# Patient Record
Sex: MALE | Race: WHITE | NOT HISPANIC OR LATINO
[De-identification: names, ages, dates, MRNs, and addresses within clinical notes are randomized per-mention and may not be internally consistent; named-entity substitution may affect disease eponyms.]

---

## 2020-09-22 ENCOUNTER — TRANSCRIPTION ENCOUNTER (OUTPATIENT)
Age: 70
End: 2020-09-22

## 2020-09-29 ENCOUNTER — TRANSCRIPTION ENCOUNTER (OUTPATIENT)
Age: 70
End: 2020-09-29

## 2020-11-21 ENCOUNTER — TRANSCRIPTION ENCOUNTER (OUTPATIENT)
Age: 70
End: 2020-11-21

## 2020-11-22 ENCOUNTER — TRANSCRIPTION ENCOUNTER (OUTPATIENT)
Age: 70
End: 2020-11-22

## 2021-02-08 ENCOUNTER — EMERGENCY (EMERGENCY)
Facility: HOSPITAL | Age: 71
LOS: 1 days | Discharge: HOME | End: 2021-02-08
Attending: EMERGENCY MEDICINE | Admitting: EMERGENCY MEDICINE
Payer: COMMERCIAL

## 2021-02-08 VITALS
RESPIRATION RATE: 16 BRPM | DIASTOLIC BLOOD PRESSURE: 90 MMHG | HEART RATE: 88 BPM | OXYGEN SATURATION: 99 % | SYSTOLIC BLOOD PRESSURE: 148 MMHG | TEMPERATURE: 99 F

## 2021-02-08 PROCEDURE — 99284 EMERGENCY DEPT VISIT MOD MDM: CPT

## 2021-02-08 NOTE — ED ADULT TRIAGE NOTE - CHIEF COMPLAINT QUOTE
Pt c/o abdominal pain x 1 day. Pain is primarily LLQ. States he has been constipated for a couple of days, had BM today, but pain still present. Denies N/V/D, fevers. PMHx GERD, Pituitary adenoma

## 2021-02-09 LAB
ALBUMIN SERPL ELPH-MCNC: 3.9 G/DL — SIGNIFICANT CHANGE UP (ref 3.3–5)
ALP SERPL-CCNC: 45 U/L — SIGNIFICANT CHANGE UP (ref 40–120)
ALT FLD-CCNC: 15 U/L — SIGNIFICANT CHANGE UP (ref 4–41)
ANION GAP SERPL CALC-SCNC: 7 MMOL/L — SIGNIFICANT CHANGE UP (ref 7–14)
APPEARANCE UR: CLEAR — SIGNIFICANT CHANGE UP
AST SERPL-CCNC: 11 U/L — SIGNIFICANT CHANGE UP (ref 4–40)
BASOPHILS # BLD AUTO: 0.05 K/UL — SIGNIFICANT CHANGE UP (ref 0–0.2)
BASOPHILS NFR BLD AUTO: 0.4 % — SIGNIFICANT CHANGE UP (ref 0–2)
BILIRUB SERPL-MCNC: 0.3 MG/DL — SIGNIFICANT CHANGE UP (ref 0.2–1.2)
BILIRUB UR-MCNC: NEGATIVE — SIGNIFICANT CHANGE UP
BUN SERPL-MCNC: 21 MG/DL — SIGNIFICANT CHANGE UP (ref 7–23)
CALCIUM SERPL-MCNC: 9.4 MG/DL — SIGNIFICANT CHANGE UP (ref 8.4–10.5)
CHLORIDE SERPL-SCNC: 101 MMOL/L — SIGNIFICANT CHANGE UP (ref 98–107)
CO2 SERPL-SCNC: 28 MMOL/L — SIGNIFICANT CHANGE UP (ref 22–31)
COLOR SPEC: YELLOW — SIGNIFICANT CHANGE UP
CREAT SERPL-MCNC: 0.95 MG/DL — SIGNIFICANT CHANGE UP (ref 0.5–1.3)
DIFF PNL FLD: NEGATIVE — SIGNIFICANT CHANGE UP
EOSINOPHIL # BLD AUTO: 0.27 K/UL — SIGNIFICANT CHANGE UP (ref 0–0.5)
EOSINOPHIL NFR BLD AUTO: 2.2 % — SIGNIFICANT CHANGE UP (ref 0–6)
GLUCOSE SERPL-MCNC: 80 MG/DL — SIGNIFICANT CHANGE UP (ref 70–99)
GLUCOSE UR QL: NEGATIVE — SIGNIFICANT CHANGE UP
HCT VFR BLD CALC: 40.1 % — SIGNIFICANT CHANGE UP (ref 39–50)
HGB BLD-MCNC: 13.1 G/DL — SIGNIFICANT CHANGE UP (ref 13–17)
IANC: 8.98 K/UL — HIGH (ref 1.5–8.5)
IMM GRANULOCYTES NFR BLD AUTO: 0.5 % — SIGNIFICANT CHANGE UP (ref 0–1.5)
KETONES UR-MCNC: NEGATIVE — SIGNIFICANT CHANGE UP
LEUKOCYTE ESTERASE UR-ACNC: NEGATIVE — SIGNIFICANT CHANGE UP
LIDOCAIN IGE QN: 33 U/L — SIGNIFICANT CHANGE UP (ref 7–60)
LYMPHOCYTES # BLD AUTO: 1.63 K/UL — SIGNIFICANT CHANGE UP (ref 1–3.3)
LYMPHOCYTES # BLD AUTO: 13.1 % — SIGNIFICANT CHANGE UP (ref 13–44)
MCHC RBC-ENTMCNC: 30.7 PG — SIGNIFICANT CHANGE UP (ref 27–34)
MCHC RBC-ENTMCNC: 32.7 GM/DL — SIGNIFICANT CHANGE UP (ref 32–36)
MCV RBC AUTO: 93.9 FL — SIGNIFICANT CHANGE UP (ref 80–100)
MONOCYTES # BLD AUTO: 1.41 K/UL — HIGH (ref 0–0.9)
MONOCYTES NFR BLD AUTO: 11.4 % — SIGNIFICANT CHANGE UP (ref 2–14)
NEUTROPHILS # BLD AUTO: 8.98 K/UL — HIGH (ref 1.8–7.4)
NEUTROPHILS NFR BLD AUTO: 72.4 % — SIGNIFICANT CHANGE UP (ref 43–77)
NITRITE UR-MCNC: NEGATIVE — SIGNIFICANT CHANGE UP
NRBC # BLD: 0 /100 WBCS — SIGNIFICANT CHANGE UP
NRBC # FLD: 0 K/UL — SIGNIFICANT CHANGE UP
PH UR: 6 — SIGNIFICANT CHANGE UP (ref 5–8)
PLATELET # BLD AUTO: 277 K/UL — SIGNIFICANT CHANGE UP (ref 150–400)
POTASSIUM SERPL-MCNC: 4.1 MMOL/L — SIGNIFICANT CHANGE UP (ref 3.5–5.3)
POTASSIUM SERPL-SCNC: 4.1 MMOL/L — SIGNIFICANT CHANGE UP (ref 3.5–5.3)
PROT SERPL-MCNC: 7 G/DL — SIGNIFICANT CHANGE UP (ref 6–8.3)
PROT UR-MCNC: NEGATIVE — SIGNIFICANT CHANGE UP
RBC # BLD: 4.27 M/UL — SIGNIFICANT CHANGE UP (ref 4.2–5.8)
RBC # FLD: 13.2 % — SIGNIFICANT CHANGE UP (ref 10.3–14.5)
SODIUM SERPL-SCNC: 136 MMOL/L — SIGNIFICANT CHANGE UP (ref 135–145)
SP GR SPEC: 1.03 — HIGH (ref 1.01–1.02)
UROBILINOGEN FLD QL: SIGNIFICANT CHANGE UP
WBC # BLD: 12.4 K/UL — HIGH (ref 3.8–10.5)
WBC # FLD AUTO: 12.4 K/UL — HIGH (ref 3.8–10.5)

## 2021-02-09 PROCEDURE — 74177 CT ABD & PELVIS W/CONTRAST: CPT | Mod: 26

## 2021-02-09 RX ADMIN — Medication 1 TABLET(S): at 04:58

## 2021-02-09 NOTE — ED PROVIDER NOTE - PROGRESS NOTE DETAILS
ED Attending: Jesus  Pt signed out to me from Dr. Salcido to f/u and reassess. States pain is improving.  Divertic reviewed, rx augmentin sent.  Pt states he has a GI and is due for a colonoscopy.  Will f/u.

## 2021-02-09 NOTE — ED PROVIDER NOTE - NSFOLLOWUPINSTRUCTIONS_ED_ALL_ED_FT
Your pain is likely caused by diverticulitis, an inflammatory condition of the colon. Please take Augmentin as prescribed for 10 days. NSAID medications such as Ibuprofen 400 mg every 6 hours are effective for this kind of pain.  If you cannot take NSAIDs you may use Tylenol 650 mg every 6 hours as needed.     Return to the ER for fevers, worsening pain, bleeding, vomiting, or other concerning signs.     Please make an appointment to follow up with your GI doctor since you should have a colonoscopy once you're feeling better. Also, please follow up with your primary care doctor.

## 2021-02-09 NOTE — ED PROVIDER NOTE - PHYSICAL EXAMINATION
gen: well appearing  Mentation: AAO x 3  psych: mood appropriate  ENT: airway patent  Eyes: conjunctivae clear bilaterally  Cardio: RRR, no m/r/g  Resp: normal BS b/l  GI: s/llq tenderness/nd, no rigidity, no guarding  Neuro: AAO x 3, sensation and motor function intact,  Skin: No evidence of rash  MSK: normal movement of all extremities

## 2021-02-09 NOTE — ED ADULT NURSE NOTE - OBJECTIVE STATEMENT
Patient is a 70y male, A&Ox4, Ambulatory, complaining of left lower abdominal pain x 1 day, worsening throughout the day. Pain was severe at rest but now is tolerable while resting, worse with palpation. Patient is passing flatus. Last bowel movement today. IV initiated 20 gauge right antecubital, labs drawn and sent. Stretcher in lowest position, wheels locked , side rails up. Awaiting laboratory results. Awaiting CT.

## 2021-02-09 NOTE — ED PROVIDER NOTE - PATIENT PORTAL LINK FT
You can access the FollowMyHealth Patient Portal offered by [facility] by registering at the following website: [email or web address]. By joining [employer]’s FollowMyHealth portal, you will also be able to view your health information using other applications (apps) compatible with our system.

## 2021-02-09 NOTE — ED PROVIDER NOTE - CLINICAL SUMMARY MEDICAL DECISION MAKING FREE TEXT BOX
71 yo male presenting with 2 day hx of llq pain; rule out intra abdominal pathology with ctap; labs urine re eval; patient offered pain meds but does not want at this time.

## 2021-02-09 NOTE — ED PROVIDER NOTE - OBJECTIVE STATEMENT
69 yo male presenting with 2 day hx of llq pain associated with nausea.  Pain described as sharp with no radiation, currently 2/10 in severity.  worse with walking.  did not take anything for his symptoms.  denies testicular pain or burning with urination.

## 2021-02-09 NOTE — ED ADULT NURSE NOTE - NSIMPLEMENTINTERV_GEN_ALL_ED
Implemented All Universal Safety Interventions:  [place] to call system. Call bell, personal items and telephone within reach. Instruct patient to call for assistance. Room bathroom lighting operational. Non-slip footwear when patient is off stretcher. Physically safe environment: no spills, clutter or unnecessary equipment. Stretcher in lowest position, wheels locked, appropriate side rails in place.

## 2021-02-10 LAB
CULTURE RESULTS: SIGNIFICANT CHANGE UP
SPECIMEN SOURCE: SIGNIFICANT CHANGE UP

## 2022-05-07 ENCOUNTER — NON-APPOINTMENT (OUTPATIENT)
Age: 72
End: 2022-05-07

## 2022-05-10 ENCOUNTER — NON-APPOINTMENT (OUTPATIENT)
Age: 72
End: 2022-05-10

## 2022-06-22 ENCOUNTER — INPATIENT (INPATIENT)
Facility: HOSPITAL | Age: 72
LOS: 1 days | Discharge: HOME | DRG: 247 | End: 2022-06-24
Attending: HOSPITALIST | Admitting: HOSPITALIST
Payer: COMMERCIAL

## 2022-06-22 VITALS
HEART RATE: 66 BPM | RESPIRATION RATE: 17 BRPM | DIASTOLIC BLOOD PRESSURE: 93 MMHG | WEIGHT: 192.02 LBS | SYSTOLIC BLOOD PRESSURE: 164 MMHG | HEIGHT: 68 IN | OXYGEN SATURATION: 98 % | TEMPERATURE: 98 F

## 2022-06-22 DIAGNOSIS — Z98.890 OTHER SPECIFIED POSTPROCEDURAL STATES: Chronic | ICD-10-CM

## 2022-06-22 DIAGNOSIS — I20.0 UNSTABLE ANGINA: ICD-10-CM

## 2022-06-22 LAB
ALBUMIN SERPL ELPH-MCNC: 4.2 G/DL — SIGNIFICANT CHANGE UP (ref 3.3–5)
ALP SERPL-CCNC: 46 U/L — SIGNIFICANT CHANGE UP (ref 40–120)
ALT FLD-CCNC: 16 U/L — SIGNIFICANT CHANGE UP (ref 10–45)
ANION GAP SERPL CALC-SCNC: 9 MMOL/L — SIGNIFICANT CHANGE UP (ref 5–17)
APTT BLD: 40.7 SEC — HIGH (ref 27.5–35.5)
AST SERPL-CCNC: 17 U/L — SIGNIFICANT CHANGE UP (ref 10–40)
BASOPHILS # BLD AUTO: 0.05 K/UL — SIGNIFICANT CHANGE UP (ref 0–0.2)
BASOPHILS NFR BLD AUTO: 0.7 % — SIGNIFICANT CHANGE UP (ref 0–2)
BILIRUB SERPL-MCNC: 0.4 MG/DL — SIGNIFICANT CHANGE UP (ref 0.2–1.2)
BUN SERPL-MCNC: 22 MG/DL — SIGNIFICANT CHANGE UP (ref 7–23)
CALCIUM SERPL-MCNC: 9.3 MG/DL — SIGNIFICANT CHANGE UP (ref 8.4–10.5)
CHLORIDE SERPL-SCNC: 102 MMOL/L — SIGNIFICANT CHANGE UP (ref 96–108)
CO2 SERPL-SCNC: 28 MMOL/L — SIGNIFICANT CHANGE UP (ref 22–31)
CREAT SERPL-MCNC: 0.95 MG/DL — SIGNIFICANT CHANGE UP (ref 0.5–1.3)
EGFR: 85 ML/MIN/1.73M2 — SIGNIFICANT CHANGE UP
EOSINOPHIL # BLD AUTO: 0.29 K/UL — SIGNIFICANT CHANGE UP (ref 0–0.5)
EOSINOPHIL NFR BLD AUTO: 4 % — SIGNIFICANT CHANGE UP (ref 0–6)
GLUCOSE SERPL-MCNC: 100 MG/DL — HIGH (ref 70–99)
HCT VFR BLD CALC: 38.4 % — LOW (ref 39–50)
HGB BLD-MCNC: 13.2 G/DL — SIGNIFICANT CHANGE UP (ref 13–17)
IMM GRANULOCYTES NFR BLD AUTO: 0.1 % — SIGNIFICANT CHANGE UP (ref 0–1.5)
INR BLD: 1.08 — SIGNIFICANT CHANGE UP (ref 0.88–1.16)
LYMPHOCYTES # BLD AUTO: 1.87 K/UL — SIGNIFICANT CHANGE UP (ref 1–3.3)
LYMPHOCYTES # BLD AUTO: 25.8 % — SIGNIFICANT CHANGE UP (ref 13–44)
MCHC RBC-ENTMCNC: 32.8 PG — SIGNIFICANT CHANGE UP (ref 27–34)
MCHC RBC-ENTMCNC: 34.4 GM/DL — SIGNIFICANT CHANGE UP (ref 32–36)
MCV RBC AUTO: 95.3 FL — SIGNIFICANT CHANGE UP (ref 80–100)
MONOCYTES # BLD AUTO: 0.7 K/UL — SIGNIFICANT CHANGE UP (ref 0–0.9)
MONOCYTES NFR BLD AUTO: 9.7 % — SIGNIFICANT CHANGE UP (ref 2–14)
NEUTROPHILS # BLD AUTO: 4.33 K/UL — SIGNIFICANT CHANGE UP (ref 1.8–7.4)
NEUTROPHILS NFR BLD AUTO: 59.7 % — SIGNIFICANT CHANGE UP (ref 43–77)
NRBC # BLD: 0 /100 WBCS — SIGNIFICANT CHANGE UP (ref 0–0)
NT-PROBNP SERPL-SCNC: 57 PG/ML — SIGNIFICANT CHANGE UP (ref 0–300)
PLATELET # BLD AUTO: 292 K/UL — SIGNIFICANT CHANGE UP (ref 150–400)
POTASSIUM SERPL-MCNC: 4.4 MMOL/L — SIGNIFICANT CHANGE UP (ref 3.5–5.3)
POTASSIUM SERPL-SCNC: 4.4 MMOL/L — SIGNIFICANT CHANGE UP (ref 3.5–5.3)
PROT SERPL-MCNC: 7.1 G/DL — SIGNIFICANT CHANGE UP (ref 6–8.3)
PROTHROM AB SERPL-ACNC: 12.9 SEC — SIGNIFICANT CHANGE UP (ref 10.5–13.4)
RBC # BLD: 4.03 M/UL — LOW (ref 4.2–5.8)
RBC # FLD: 13.7 % — SIGNIFICANT CHANGE UP (ref 10.3–14.5)
SARS-COV-2 RNA SPEC QL NAA+PROBE: NEGATIVE — SIGNIFICANT CHANGE UP
SODIUM SERPL-SCNC: 139 MMOL/L — SIGNIFICANT CHANGE UP (ref 135–145)
TROPONIN T SERPL-MCNC: 0.01 NG/ML — SIGNIFICANT CHANGE UP (ref 0–0.01)
TROPONIN T SERPL-MCNC: 0.01 NG/ML — SIGNIFICANT CHANGE UP (ref 0–0.01)
WBC # BLD: 7.25 K/UL — SIGNIFICANT CHANGE UP (ref 3.8–10.5)
WBC # FLD AUTO: 7.25 K/UL — SIGNIFICANT CHANGE UP (ref 3.8–10.5)

## 2022-06-22 PROCEDURE — 99285 EMERGENCY DEPT VISIT HI MDM: CPT

## 2022-06-22 PROCEDURE — 75574 CT ANGIO HRT W/3D IMAGE: CPT | Mod: 26,MC

## 2022-06-22 PROCEDURE — 71045 X-RAY EXAM CHEST 1 VIEW: CPT | Mod: 26

## 2022-06-22 PROCEDURE — 99223 1ST HOSP IP/OBS HIGH 75: CPT

## 2022-06-22 PROCEDURE — 93010 ELECTROCARDIOGRAM REPORT: CPT | Mod: 59

## 2022-06-22 PROCEDURE — 71275 CT ANGIOGRAPHY CHEST: CPT | Mod: 26,MC

## 2022-06-22 PROCEDURE — 74174 CTA ABD&PLVS W/CONTRAST: CPT | Mod: 26,MC

## 2022-06-22 RX ORDER — ATORVASTATIN CALCIUM 80 MG/1
40 TABLET, FILM COATED ORAL AT BEDTIME
Refills: 0 | Status: DISCONTINUED | OUTPATIENT
Start: 2022-06-22 | End: 2022-06-24

## 2022-06-22 RX ORDER — METOPROLOL TARTRATE 50 MG
12.5 TABLET ORAL EVERY 12 HOURS
Refills: 0 | Status: DISCONTINUED | OUTPATIENT
Start: 2022-06-23 | End: 2022-06-23

## 2022-06-22 RX ORDER — METOPROLOL TARTRATE 50 MG
12.5 TABLET ORAL ONCE
Refills: 0 | Status: DISCONTINUED | OUTPATIENT
Start: 2022-06-22 | End: 2022-06-23

## 2022-06-22 RX ORDER — ASPIRIN/CALCIUM CARB/MAGNESIUM 324 MG
81 TABLET ORAL DAILY
Refills: 0 | Status: DISCONTINUED | OUTPATIENT
Start: 2022-06-23 | End: 2022-06-24

## 2022-06-22 RX ORDER — ASPIRIN/CALCIUM CARB/MAGNESIUM 324 MG
325 TABLET ORAL ONCE
Refills: 0 | Status: COMPLETED | OUTPATIENT
Start: 2022-06-22 | End: 2022-06-22

## 2022-06-22 RX ORDER — SODIUM CHLORIDE 9 MG/ML
1000 INJECTION INTRAMUSCULAR; INTRAVENOUS; SUBCUTANEOUS
Refills: 0 | Status: DISCONTINUED | OUTPATIENT
Start: 2022-06-22 | End: 2022-06-23

## 2022-06-22 RX ORDER — CLOPIDOGREL BISULFATE 75 MG/1
600 TABLET, FILM COATED ORAL ONCE
Refills: 0 | Status: COMPLETED | OUTPATIENT
Start: 2022-06-22 | End: 2022-06-22

## 2022-06-22 RX ORDER — SODIUM CHLORIDE 9 MG/ML
1000 INJECTION INTRAMUSCULAR; INTRAVENOUS; SUBCUTANEOUS ONCE
Refills: 0 | Status: COMPLETED | OUTPATIENT
Start: 2022-06-22 | End: 2022-06-22

## 2022-06-22 RX ORDER — METOPROLOL TARTRATE 50 MG
12.5 TABLET ORAL ONCE
Refills: 0 | Status: COMPLETED | OUTPATIENT
Start: 2022-06-22 | End: 2022-06-22

## 2022-06-22 RX ORDER — CLOPIDOGREL BISULFATE 75 MG/1
75 TABLET, FILM COATED ORAL DAILY
Refills: 0 | Status: DISCONTINUED | OUTPATIENT
Start: 2022-06-23 | End: 2022-06-24

## 2022-06-22 RX ADMIN — Medication 12.5 MILLIGRAM(S): at 18:42

## 2022-06-22 RX ADMIN — CLOPIDOGREL BISULFATE 600 MILLIGRAM(S): 75 TABLET, FILM COATED ORAL at 18:42

## 2022-06-22 RX ADMIN — SODIUM CHLORIDE 75 MILLILITER(S): 9 INJECTION INTRAMUSCULAR; INTRAVENOUS; SUBCUTANEOUS at 22:49

## 2022-06-22 RX ADMIN — ATORVASTATIN CALCIUM 40 MILLIGRAM(S): 80 TABLET, FILM COATED ORAL at 22:49

## 2022-06-22 RX ADMIN — SODIUM CHLORIDE 1000 MILLILITER(S): 9 INJECTION INTRAMUSCULAR; INTRAVENOUS; SUBCUTANEOUS at 12:33

## 2022-06-22 RX ADMIN — Medication 325 MILLIGRAM(S): at 18:41

## 2022-06-22 NOTE — PATIENT PROFILE ADULT - FALL HARM RISK - HARM RISK INTERVENTIONS

## 2022-06-22 NOTE — ED PROVIDER NOTE - CLINICAL SUMMARY MEDICAL DECISION MAKING FREE TEXT BOX
71 y/o M w/ mid-sternal CP that radiates to back and left arm while sitting at desk this morning. Plan for labs, CTA coronaries and CTA chest, CT abd/pelvis to r/o dissection. Labs noted: trop negative. 71 y/o M w/ mid-sternal CP that radiates to back and left arm while sitting at desk this morning. Plan for labs, CTA coronaries and CTA chest, CT abd/pelvis to r/o dissection. Labs noted: trop negative.    Pt with + cta showing stenosis - pt requires admission for angio - discussed with Dr. Smith

## 2022-06-22 NOTE — H&P ADULT - PROBLEM SELECTOR PLAN 1
currently CP free  - EKG NSR TWI lead III, AVF  - Trop T negative x1, f/u CE Stat  - s/p CTA Cors/Chest/Abd/Pelvis: revealing calcium score is moderate at 378 Agatston units, Moderate to severe stenosis in the mid LAD and mid RCA, no aortic dissection, no PE.  - NPO for cardiac cath on 6/23 with Dr. Neely.  Consent in [facility] office.  email sent to add to schedule, pre cath fluids NS 75cc/hr x12hrs ordered.   - s/p ASA 325mg and Plavix 600mg PO on 6/22.  c/w ASA 81mg and Plavix 75mg  - initiated on lopressor 12.5mg PO BID and Atorvastatin 40mg QHS currently CP free  - EKG NSR TWI lead III, AVF  - Trop T negative x1, f/u CE Stat  - s/p CTA Cors/Chest/Abd/Pelvis: revealing calcium score is moderate at 378 Agatston units, Moderate to severe stenosis in the mid LAD and mid RCA, no aortic dissection, no PE.  - NPO for cardiac cath on 6/23 with Dr. Neely.  Consent in 11 [facility] office.  email sent to add to schedule, pre cath fluids NS 75cc/hr x12hrs ordered.   - s/p ASA 325mg and Plavix 600mg PO on 6/22.  c/w ASA 81mg and Plavix 75mg  - initiated on lopressor 12.5mg PO BID and Atorvastatin 40mg QHS  - Echo ordered

## 2022-06-22 NOTE — ED PROVIDER NOTE - OBJECTIVE STATEMENT
71 y/o M w/ no PMHx, no chronic meds, f/u w/ PMD/cardiologist Dr. Vic Smith, presents for mid-sternal CP that radiates to back and left arm that started while pt sitting at desk at 11am this morning. Reports similar symptoms in the past. Had workup a few months ago. Reports he thinks stress test and echo done at the time were normal. Pain improved in ED. Endorses mild discomfort in left arm. Denies SOB, diaphoresis. Nonsmoker.

## 2022-06-22 NOTE — H&P ADULT - HISTORY OF PRESENT ILLNESS
71yo male, no significant PMHx who presents to [facility] ED 6/22 with c/o chest pain.  Patient was sitting at his desk today in the office when he suddenly developed midsternal 5/10 chest pressure w/ pain in the back w/ radiation down the left arm and associated lightheadedness.  Symptoms lasted about 15 minutes and self resolved while taking a cab to the ED.  Denies any syncope, palpitations, SOB, LE swelling, orthopnea, PND, fevers, chills.  Patient's Last stress test was one year ago and normal.  On arrival to [facility] ED patient HD stable.  EKG revealing NSR @ 63bpm, w/ TWI III, AVF.  Vitals: Temp 97.9, HR 66, /93, RR 17, O2 98% RA.  Labs significant for Trop T negative, Covid negative.  Patient is s/p a CTA Coronaries, Chest/abd/pelvis revealing calcium score is moderate at 378 Agatston units, Moderate to severe stenosis in the mid LAD and mid RCA, no aortic dissection, no PE.  He received ASA 325mg, Plavix 600mg PO x1, and Lopressor 12.5mg PO x1.  Patient is now admitted to cardiac telemetry with plan for cardiac catheterization on 6/23.

## 2022-06-22 NOTE — ED ADULT TRIAGE NOTE - CHIEF COMPLAINT QUOTE
midsternal CP radiating down L arm started while sitting at his desk at work x 1 hr PTA. no prior cardiac hx. no daily meds.

## 2022-06-22 NOTE — ED ADULT NURSE NOTE - OBJECTIVE STATEMENT
Patient came to ER stating "I was sitting at work and then suddenly I started to have chest pain, back pain and left arm pain". Patient denies dizziness, weakness, sob, NV.

## 2022-06-23 ENCOUNTER — TRANSCRIPTION ENCOUNTER (OUTPATIENT)
Age: 72
End: 2022-06-23

## 2022-06-23 LAB
ANION GAP SERPL CALC-SCNC: 8 MMOL/L — SIGNIFICANT CHANGE UP (ref 5–17)
APTT BLD: 39.1 SEC — HIGH (ref 27.5–35.5)
BUN SERPL-MCNC: 14 MG/DL — SIGNIFICANT CHANGE UP (ref 7–23)
CALCIUM SERPL-MCNC: 8.9 MG/DL — SIGNIFICANT CHANGE UP (ref 8.4–10.5)
CHLORIDE SERPL-SCNC: 101 MMOL/L — SIGNIFICANT CHANGE UP (ref 96–108)
CO2 SERPL-SCNC: 29 MMOL/L — SIGNIFICANT CHANGE UP (ref 22–31)
CREAT SERPL-MCNC: 0.82 MG/DL — SIGNIFICANT CHANGE UP (ref 0.5–1.3)
EGFR: 93 ML/MIN/1.73M2 — SIGNIFICANT CHANGE UP
GLUCOSE SERPL-MCNC: 101 MG/DL — HIGH (ref 70–99)
HCT VFR BLD CALC: 37.7 % — LOW (ref 39–50)
HGB BLD-MCNC: 13 G/DL — SIGNIFICANT CHANGE UP (ref 13–17)
INR BLD: 1.1 — SIGNIFICANT CHANGE UP (ref 0.88–1.16)
MAGNESIUM SERPL-MCNC: 2 MG/DL — SIGNIFICANT CHANGE UP (ref 1.6–2.6)
MCHC RBC-ENTMCNC: 32.3 PG — SIGNIFICANT CHANGE UP (ref 27–34)
MCHC RBC-ENTMCNC: 34.5 GM/DL — SIGNIFICANT CHANGE UP (ref 32–36)
MCV RBC AUTO: 93.5 FL — SIGNIFICANT CHANGE UP (ref 80–100)
NRBC # BLD: 0 /100 WBCS — SIGNIFICANT CHANGE UP (ref 0–0)
PLATELET # BLD AUTO: 262 K/UL — SIGNIFICANT CHANGE UP (ref 150–400)
POTASSIUM SERPL-MCNC: SIGNIFICANT CHANGE UP MMOL/L (ref 3.5–5.3)
POTASSIUM SERPL-SCNC: SIGNIFICANT CHANGE UP MMOL/L (ref 3.5–5.3)
PROTHROM AB SERPL-ACNC: 13.1 SEC — SIGNIFICANT CHANGE UP (ref 10.5–13.4)
RBC # BLD: 4.03 M/UL — LOW (ref 4.2–5.8)
RBC # FLD: 13.7 % — SIGNIFICANT CHANGE UP (ref 10.3–14.5)
SODIUM SERPL-SCNC: 138 MMOL/L — SIGNIFICANT CHANGE UP (ref 135–145)
WBC # BLD: 6.49 K/UL — SIGNIFICANT CHANGE UP (ref 3.8–10.5)
WBC # FLD AUTO: 6.49 K/UL — SIGNIFICANT CHANGE UP (ref 3.8–10.5)

## 2022-06-23 PROCEDURE — 99152 MOD SED SAME PHYS/QHP 5/>YRS: CPT

## 2022-06-23 PROCEDURE — 99233 SBSQ HOSP IP/OBS HIGH 50: CPT

## 2022-06-23 PROCEDURE — 92928 PRQ TCAT PLMT NTRAC ST 1 LES: CPT | Mod: RC

## 2022-06-23 PROCEDURE — 93010 ELECTROCARDIOGRAM REPORT: CPT

## 2022-06-23 PROCEDURE — 93306 TTE W/DOPPLER COMPLETE: CPT | Mod: 26

## 2022-06-23 PROCEDURE — 92978 ENDOLUMINL IVUS OCT C 1ST: CPT | Mod: 26,RC

## 2022-06-23 PROCEDURE — 93458 L HRT ARTERY/VENTRICLE ANGIO: CPT | Mod: 26,59

## 2022-06-23 RX ORDER — SODIUM CHLORIDE 9 MG/ML
500 INJECTION INTRAMUSCULAR; INTRAVENOUS; SUBCUTANEOUS
Refills: 0 | Status: DISCONTINUED | OUTPATIENT
Start: 2022-06-23 | End: 2022-06-24

## 2022-06-23 RX ORDER — ACETAMINOPHEN 500 MG
650 TABLET ORAL ONCE
Refills: 0 | Status: COMPLETED | OUTPATIENT
Start: 2022-06-23 | End: 2022-06-23

## 2022-06-23 RX ADMIN — Medication 650 MILLIGRAM(S): at 18:00

## 2022-06-23 RX ADMIN — CLOPIDOGREL BISULFATE 75 MILLIGRAM(S): 75 TABLET, FILM COATED ORAL at 05:53

## 2022-06-23 RX ADMIN — ATORVASTATIN CALCIUM 40 MILLIGRAM(S): 80 TABLET, FILM COATED ORAL at 20:27

## 2022-06-23 RX ADMIN — Medication 650 MILLIGRAM(S): at 17:01

## 2022-06-23 RX ADMIN — SODIUM CHLORIDE 250 MILLILITER(S): 9 INJECTION INTRAMUSCULAR; INTRAVENOUS; SUBCUTANEOUS at 13:07

## 2022-06-23 RX ADMIN — Medication 81 MILLIGRAM(S): at 05:53

## 2022-06-23 NOTE — PROGRESS NOTE ADULT - ASSESSMENT
71yo male with no significant PMHx who presents to [facility] ED 6/22 with c/o unstable angina, found to have abnormal CCTA now pending cardiac catheterization with Dr. Marina on 6/23.

## 2022-06-23 NOTE — DISCHARGE NOTE PROVIDER - NSDCFUADDINST_GEN_ALL_CORE_FT
- Do NOT drive or operate hazardous machinery for 24 hours. Limit your physical activity for 24-48 hours. Do NOT engage in sports, heavy work or heavy lifting more than 5 lbs for 5 days.   - You MAY shower and wash the area gently with soap and water. BUT no TUB BATHS, HOT TUBS OR SWIMMING FOR 5 DAYS.  Do not keep the area covered. Do not put any lotions, creams, or ointments on the site.  - Your procedure was done through your right wrist. If you observe flank bleeding from the puncture site, it is an emergency. Please put direct pressure on the site and go directly to the ER. Bleeding under the skin may also occur and a small "black and blue" may be expected. If the area appears to be expanding or swelling around the puncture site, apply manual compression and go immediately to the nearest ER. If your arm/hand becomes cool or blue and/or you are unable to move it, this must be treated as an emergency, go directly to the nearest ER. Look for signs of infection in the wrist: fever, red streaking of the arm, obvious pus formation and pain.  -If you have any issues or concerns regarding your access site, you may call [facility] Interventional Cardiology at [phone].

## 2022-06-23 NOTE — DISCHARGE NOTE PROVIDER - CARE PROVIDER_API CALL
Feliz Smith  CARDIOVASCULAR DISEASE  [address] [place] [address], [place] [address]  Phone: [phone]  Fax: [phone]  Follow Up Time: 2 weeks

## 2022-06-23 NOTE — DISCHARGE NOTE PROVIDER - HOSPITAL COURSE
73yo male, no significant PMHx who presents to [facility] ED 6/22 with c/o 5/10 midsternal chest pain radiating to left arm and back associated with lightheadedness; symptoms resolved while on his way to ED. Denies syncope, palpitations, SOB, LE swelling, orthopnea, PND, fevers, chills.  Patient's Last stress test was one year ago and normal. Vitals in ER: Temp 97.9, HR 66, /93, RR 17, O2 98% RA.  Labs significant for Trop T negative, Covid negative.  EKG revealing NSR @ 63bpm, w/ TWI III, AVF.   Patient is s/p a CCTA Chest/abd/pelvis revealing Ca 378, mod-severe mLAD and mRCA, no aortic dissection, no PE.  He received ASA 325mg, Plavix 600mg PO x1, and Lopressor 12.5mg PO x1.  Patient was admitted to cardiac telemetry and now s/p cardiac catheterization JUNO x 1 mRCA (85%); LM normal, mLAD mild diffuse, LCx normal, D1 mild dz, RPDA mild dz, EDP 8, access R TR     73yo male with no significant PMHx who presents to [facility] ED 6/22 c/o 5/10 midsternal chest pain radiating to left arm and back associated with lightheadedness; symptoms resolved in transit to ED. Denies syncope, palpitations, SOB, LE swelling, orthopnea, PND, fevers, chills.  Patient's last stress test was one year ago and normal. Vitals in ER: Temp 97.9, HR 66, /93, RR 17, O2 98% RA.  Labs significant for Trop T negative x2, Covid negative.  EKG revealing NSR @ 63bpm, w/ TWI III, AVF.   Patient is s/p a CCTA Chest/abd/pelvis revealing Ca 378, mod-severe mLAD and mRCA, no aortic dissection, no PE.  He received ASA 325mg, Plavix 600mg PO x1, and Lopressor 12.5mg PO x1.  Patient was admitted to cardiac telemetry with plan for cardiac catheterization for unstable angina in setting of abnormal CCTA.    Pt now s/p cardiac catheterization 6/23/22 JUNO x 1 mRCA (85%); LM normal, mLAD mild diffuse, LCx normal, D1 mild dz, RPDA mild dz, EDP 8, access R TR. Remained CP free, repeat trop negative x2, HD stable, echo revealed ________. Pt seen and examined bedside this AM without any complaints or events overnight, stating CP has resolved since coming to the hospital; VSS, labs and telemetry reviewed and pt stable for discharge as discussed with Dr. Sorenson. Pt has received appropriate discharge instructions, including medication regimen, access site management, and follow up with Dr. Smith in 1-2 weeks.    Discharge medications: Aspirin 81mg PO qd, Plavix 75mg PO qd, Lipitor 40mg PO qd. 73yo male with no significant PMHx who presents to [facility] ED 6/22 c/o 5/10 midsternal chest pain radiating to left arm and back associated with lightheadedness; symptoms resolved in transit to ED. Denies syncope, palpitations, SOB, LE swelling, orthopnea, PND, fevers, chills.  Patient's last stress test was one year ago and normal. Vitals in ER: Temp 97.9, HR 66, /93, RR 17, O2 98% RA.  Labs significant for Trop T negative x2, Covid negative.  EKG revealing NSR @ 63bpm, w/ TWI III, AVF.   Patient is s/p a CCTA Chest/abd/pelvis revealing Ca 378, mod-severe mLAD and mRCA, no aortic dissection, no PE.  He received ASA 325mg, Plavix 600mg PO x1, and Lopressor 12.5mg PO x1.  Patient was admitted to cardiac telemetry with plan for cardiac catheterization for unstable angina in setting of abnormal CCTA.    Pt now s/p cardiac catheterization 6/23/22 JUNO x 1 mRCA (85%); LM normal, mLAD mild diffuse, LCx normal, D1 mild dz, RPDA mild dz, EDP 8, access R TR. Remained CP free, repeat trop negative x2, HD stable, echo revealed ________. Pt seen and examined bedside this AM without any complaints or events overnight, stating CP has resolved since coming to the hospital; VSS, labs and telemetry reviewed and pt stable for discharge as discussed with Dr. Sorenson. Pt has received appropriate discharge instructions, including medication regimen, access site management, and follow up with Dr. Smith in 1-2 weeks.    Discharge medications: Aspirin 81mg PO qd, Plavix 75mg PO qd, Lipitor 40mg PO qd. 71 y/o male with no significant PMHx who presents to [facility] ED 6/22 c/o 5/10 midsternal chest pain radiating to left arm and back associated with lightheadedness while sitting at desk x 15mins; symptoms resolved in transit to ED.  Patient's last stress test was one year ago and normal. Labs significant for Trop T negative x2, Covid negative.  EKG revealing NSR @ 63bpm, w/ TWI III, AVF.  CCTA Chest/abd/pelvis revealing Ca 378, mod-severe mLAD and mRCA stenosis, no aortic dissection, no PE.  He received ASA 325mg/Plavix 600mg load, and Lopressor 12.5mg PO x1. Admitted to cardiac telemetry for cardiac catheterization for unstable angina in setting of abnormal CCTA. Pt underwent cardiac catheterization 6/23/22: s/p JUNO x 1 mRCA (85%); LM normal, mLAD mild diffuse, LCx normal, D1 mild dz, RPDA mild dz, EDP 8, access R TR. Remained CP free, HD stable. Echo revealed EF 55-60%, trace AI, trivial pericardial effusion.     On the day of discharge, pt was seen and examined at bedside. Denies any complaints of chest pain, dizziness, SOB, palpitations, pain, LE edema, fever and/or chills. Right radial access site soft, no bleeding or swelling at site, radial pulse 2+, DP/PT pulses at baseline. No events on tele overnight, VSS, Labs unremarkable/stable, Physical exam WNL. Pt was seen and examined by cardiology attending as well and is deemed stable for discharge per Dr Sorenson.  Pt is to continue ASA/Plavix, Atorvastatin 40mg qd. Pt is to follow up with cardiologist Dr. Smith in 1-2 weeks for post discharge check-up. Pt instructed to return to ED/seek immediate medical attention if symptoms of chest pain, SOB, LOC, bleeding from the access site. Pt agrees with the discharge plan, verbalizes understanding of the information given. All new medications explained risks/side effects and e-prescribed to patient preferred pharmacy. Referred for Cardiac Rehab (CAD post PCI): Education on benefits of Cardiac Rehab provided to patient. Referral and Prescription Given for Cardiac Rehab. Pt given list of locations & instructed to contact their insurance company to review list of participating providers. Pt instructed to bring Cardiac Rehab prescription with them to Cardiology Follow up appointment for assistance with enrollment.

## 2022-06-23 NOTE — PROGRESS NOTE ADULT - PROBLEM SELECTOR PLAN 1
currently CP free  - EKG NSR TWI lead III, AVF  - Trop T negative x2  - s/p CTA Cors/Chest/Abd/Pelvis: Ca score is 378, mod-severe mLAD and mRCA, no aortic dissection, no PE.  - s/p cardiac cath 6/23/22 JUNO mRCA (85%); LM normal, mLAD mild diffuse, LCx normal, D1 mild dz, RPDA mild dz, EDP 8, access R TR  - pending TTE   - c/w ASA 81mg qd, Plavix 75mg qd, Lipitor 40mg qd

## 2022-06-23 NOTE — PROGRESS NOTE ADULT - SUBJECTIVE AND OBJECTIVE BOX
71yo male, no significant PMHx who presents to [facility] ED 6/22 with c/o chest pain.  Patient was sitting at his desk today in the office when he suddenly developed midsternal 5/10 chest pressure w/ pain in the back w/ radiation down the left arm and associated lightheadedness.  Symptoms lasted about 15 minutes and self resolved while taking a cab to the ED.  Denies any syncope, palpitations, SOB, LE swelling, orthopnea, PND, fevers, chills.  Patient's Last stress test was one year ago and normal.  On arrival to [facility] ED patient HD stable.  EKG revealing NSR @ 63bpm, w/ TWI III, AVF.  Vitals: Temp 97.9, HR 66, /93, RR 17, O2 98% RA.  Labs significant for Trop T negative, Covid negative.  Patient is s/p a CTA Coronaries, Chest/abd/pelvis revealing calcium score is moderate at 378 Agatston units, Moderate to severe stenosis in the mid LAD and mid RCA, no aortic dissection, no PE.  He received ASA 325mg, Plavix 600mg PO x1, and Lopressor 12.5mg PO x1.  Patient is now admitted to cardiac telemetry with plan for cardiac catheterization on 6/23.  Interventional Cardiology PA Adult Progress Note    C.C.:     Subjective Assessment:      ROS Negative except as per Subjective and HPI  	  MEDICATIONS:  metoprolol tartrate 12.5 milliGRAM(s) Oral every 12 hours            atorvastatin 40 milliGRAM(s) Oral at bedtime    aspirin enteric coated 81 milliGRAM(s) Oral daily  clopidogrel Tablet 75 milliGRAM(s) Oral daily  sodium chloride 0.9%. 1000 milliLiter(s) IV Continuous <Continuous>      	    [PHYSICAL EXAM:  TELEMETRY:  T(C): 35.7 (06-23-22 @ 05:05), Max: 36.7 (06-22-22 @ 18:22)  HR: 48 (06-23-22 @ 05:13) (48 - 66)  BP: 139/69 (06-23-22 @ 05:13) (120/74 - 170/85)  RR: 16 (06-23-22 @ 05:13) (16 - 18)  SpO2: 95% (06-23-22 @ 05:13) (94% - 98%)  Wt(kg): --  I&O's Summary    22 Jun 2022 07:01  -  23 Jun 2022 07:00  --------------------------------------------------------  IN: 0 mL / OUT: 775 mL / NET: -775 mL      Height (cm): 172.7 (06-22 @ 11:25)  Weight (kg): 87.1 (06-22 @ 11:25)  BMI (kg/m2): 29.2 (06-22 @ 11:25)  BSA (m2): 2.01 (06-22 @ 11:25)  Valderrama:  Central/PICC/Mid Line:                                         Appearance: Normal	  HEENT:   Normal oral mucosa, PERRL, EOMI	  Neck: Supple, + JVD/ - JVD; Carotid Bruit   Cardiovascular: Normal S1 S2, No JVD, No murmurs,   Respiratory: Lungs clear to auscultation/Decreased Breath Sounds/No Rales, Rhonchi, Wheezing	  Gastrointestinal:  Soft, Non-tender, + BS	  Skin: No rashes, No ecchymoses, No cyanosis  Extremities: Normal range of motion, No clubbing, cyanosis or edema  Vascular: Peripheral pulses palpable 2+ bilaterally  Neurologic: Non-focal  Psychiatry: A & O x 3, Mood & affect appropriate      	    ECG:  	  RADIOLOGY:   DIAGNOSTIC TESTING:  [ ] Echocardiogram:  [ ]  Catheterization:  [ ] Stress Test:    [ ] FRIEDA  OTHER: 	    LABS:	 	  CARDIAC MARKERS:                                  13.2   7.25  )-----------( 292      ( 22 Jun 2022 12:27 )             38.4     06-22    139  |  102  |  22  ----------------------------<  100<H>  4.4   |  28  |  0.95    Ca    9.3      22 Jun 2022 12:27    TPro  7.1  /  Alb  4.2  /  TBili  0.4  /  DBili  x   /  AST  17  /  ALT  16  /  AlkPhos  46  06-22    proBNP: Serum Pro-Brain Natriuretic Peptide: 57 pg/mL (06-22 @ 12:27)    Lipid Profile:   HgA1c:   TSH:   PT/INR - ( 22 Jun 2022 12:27 )   PT: 12.9 sec;   INR: 1.08          PTT - ( 22 Jun 2022 12:27 )  PTT:40.7 sec    ASSESSMENT/PLAN: 	        DVT ppx:  Dispo:     Interventional Cardiology PA Adult Progress Note    C.C.: chest pain    Subjective Assessment: Pt seen and examined at bedside this AM, sitting up comfortably in bed without any overnight events. Pt states CP has resolved since coming to the hospital. Denies SOB, YOUNG, palpitations, dizziness, LOC, N/V/D, fever/chills/sick contact, diaphoresis, orthopnea/PND, and leg swelling.      ROS Negative except as per Subjective and HPI  	  MEDICATIONS:  metoprolol tartrate 12.5 milliGRAM(s) Oral every 12 hours            atorvastatin 40 milliGRAM(s) Oral at bedtime    aspirin enteric coated 81 milliGRAM(s) Oral daily  clopidogrel Tablet 75 milliGRAM(s) Oral daily  sodium chloride 0.9%. 1000 milliLiter(s) IV Continuous <Continuous>      	    [PHYSICAL EXAM:  TELEMETRY:  T(C): 35.7 (06-23-22 @ 05:05), Max: 36.7 (06-22-22 @ 18:22)  HR: 48 (06-23-22 @ 05:13) (48 - 66)  BP: 139/69 (06-23-22 @ 05:13) (120/74 - 170/85)  RR: 16 (06-23-22 @ 05:13) (16 - 18)  SpO2: 95% (06-23-22 @ 05:13) (94% - 98%)  Wt(kg): --  I&O's Summary    22 Jun 2022 07:01  -  23 Jun 2022 07:00  --------------------------------------------------------  IN: 0 mL / OUT: 775 mL / NET: -775 mL      Height (cm): 172.7 (06-22 @ 11:25)  Weight (kg): 87.1 (06-22 @ 11:25)  BMI (kg/m2): 29.2 (06-22 @ 11:25)  BSA (m2): 2.01 (06-22 @ 11:25)  Valderrama:  Central/PICC/Mid Line:                                         Appearance: Normal	  HEENT:   Normal oral mucosa, PERRL, EOMI	  Neck: Supple,  - JVD; no Carotid Bruit   Cardiovascular: Normal S1 S2, No JVD, No murmurs,   Respiratory: Lungs clear to auscultation, No Rales, Rhonchi, Wheezing	  Gastrointestinal:  Soft, Non-tender, + BS	  Skin: No rashes, No ecchymoses, No cyanosis  Extremities: Normal range of motion, No clubbing, cyanosis or edema  Vascular: Peripheral pulses palpable 2+ bilaterally  Neurologic: Non-focal  Psychiatry: A & O x 3, Mood & affect appropriate      	    ECG:  	  RADIOLOGY:   DIAGNOSTIC TESTING:  [ ] Echocardiogram:  [ ]  Catheterization:  [ ] Stress Test:    [ ] FRIEDA  OTHER: 	    LABS:	 	  CARDIAC MARKERS:                                  13.2   7.25  )-----------( 292      ( 22 Jun 2022 12:27 )             38.4     06-22    139  |  102  |  22  ----------------------------<  100<H>  4.4   |  28  |  0.95    Ca    9.3      22 Jun 2022 12:27    TPro  7.1  /  Alb  4.2  /  TBili  0.4  /  DBili  x   /  AST  17  /  ALT  16  /  AlkPhos  46  06-22    proBNP: Serum Pro-Brain Natriuretic Peptide: 57 pg/mL (06-22 @ 12:27)    Lipid Profile:   HgA1c:   TSH:   PT/INR - ( 22 Jun 2022 12:27 )   PT: 12.9 sec;   INR: 1.08          PTT - ( 22 Jun 2022 12:27 )  PTT:40.7 sec

## 2022-06-23 NOTE — DISCHARGE NOTE PROVIDER - NSDCMRMEDTOKEN_GEN_ALL_CORE_FT
aspirin 81 mg oral delayed release tablet: 1 tab(s) orally once a day  atorvastatin 40 mg oral tablet: 1 tab(s) orally once a day (at bedtime)  Cardiac rehabilitation. 3 times a week for 12 weeks. Dx CAD s/p PCI. Outpatient cardiologist Dr Feliz Smith:   clopidogrel 75 mg oral tablet: 1 tab(s) orally once a day

## 2022-06-23 NOTE — DISCHARGE NOTE PROVIDER - NSDCCPCAREPLAN_GEN_ALL_CORE_FT
PRINCIPAL DISCHARGE DIAGNOSIS  Diagnosis: Unstable angina  Assessment and Plan of Treatment: You were found to have blockages in the arteries of your heart, also known as Coronary Artery Disease. You underwent a cardiac catheterization on 6/23/22 and received a stent to the mid right coronary artery.  PLEASE CONTINUE ASPIRIN 81MG DAILY, PLAVIX 75MG DAILY, AND LIPITOR 40MG DAILY. DO NOT STOP THESE MEDICATIONS FOR ANY REASON AS THEY ARE KEEPING YOUR STENT OPEN AND PREVENTING A HEART ATTACK.   Avoid strenuous activity or heavy lifting anything more than 5lbs for the next five days. Do not take a bath or swim for the next five days; you may shower. For any bleeding or hematoma formation (hardened blood collection under the skin) at the access site of your right hand please hold pressure and go to the emergency room. Please follow up with Dr. Smith in 1-2 weeks. For recurrent chest pain, please call your doctor or go to the emergency room.        SECONDARY DISCHARGE DIAGNOSES  Diagnosis: HLD (hyperlipidemia)  Assessment and Plan of Treatment: Please start Lipitor 40mg daily at bedtime to keep your cholesterol low. High cholesterol contributes to heart disease.       PRINCIPAL DISCHARGE DIAGNOSIS  Diagnosis: Unstable angina  Assessment and Plan of Treatment: You came into the hospital for chest pain and had an abnormal CTA Scan of the heart where it found to have blockages in the arteries of your heart, also known as Coronary Artery Disease. You underwent a cardiac catheterization on 6/23/22 and received 1 drug-eluding stent to the mid right coronary artery 85% blockage. PLEASE START TAKING ASPIRIN 81MG DAILY, PLAVIX 75MG DAILY, AND LIPITOR 40MG DAILY. DO NOT STOP THESE MEDICATIONS UNLESS DIRECTED BY YOUR CARDIOLOGIST AS THEY ARE KEEPING YOUR STENT OPEN AND PREVENTING A HEART ATTACK.      SECONDARY DISCHARGE DIAGNOSES  Diagnosis: HLD (hyperlipidemia)  Assessment and Plan of Treatment: Your LDL (bad cholesterol) was noted to be 103. For patients with coronary artery disease, your LDL goal should be less than 70.  Please start taking Lipitor 40mg daily at bedtime to keep your cholesterol low. High cholesterol contributes to heart disease.

## 2022-06-24 ENCOUNTER — TRANSCRIPTION ENCOUNTER (OUTPATIENT)
Age: 72
End: 2022-06-24

## 2022-06-24 VITALS — TEMPERATURE: 97 F

## 2022-06-24 LAB
A1C WITH ESTIMATED AVERAGE GLUCOSE RESULT: 5.6 % — SIGNIFICANT CHANGE UP (ref 4–5.6)
ANION GAP SERPL CALC-SCNC: 9 MMOL/L — SIGNIFICANT CHANGE UP (ref 5–17)
BUN SERPL-MCNC: 12 MG/DL — SIGNIFICANT CHANGE UP (ref 7–23)
CALCIUM SERPL-MCNC: 9.2 MG/DL — SIGNIFICANT CHANGE UP (ref 8.4–10.5)
CHLORIDE SERPL-SCNC: 105 MMOL/L — SIGNIFICANT CHANGE UP (ref 96–108)
CHOLEST SERPL-MCNC: 149 MG/DL — SIGNIFICANT CHANGE UP
CO2 SERPL-SCNC: 29 MMOL/L — SIGNIFICANT CHANGE UP (ref 22–31)
CREAT SERPL-MCNC: 0.93 MG/DL — SIGNIFICANT CHANGE UP (ref 0.5–1.3)
EGFR: 87 ML/MIN/1.73M2 — SIGNIFICANT CHANGE UP
ESTIMATED AVERAGE GLUCOSE: 114 MG/DL — SIGNIFICANT CHANGE UP (ref 68–114)
GLUCOSE SERPL-MCNC: 103 MG/DL — HIGH (ref 70–99)
HCT VFR BLD CALC: 36.4 % — LOW (ref 39–50)
HDLC SERPL-MCNC: 32 MG/DL — LOW
HGB BLD-MCNC: 12.3 G/DL — LOW (ref 13–17)
LIPID PNL WITH DIRECT LDL SERPL: 103 MG/DL — HIGH
MAGNESIUM SERPL-MCNC: 2.3 MG/DL — SIGNIFICANT CHANGE UP (ref 1.6–2.6)
MCHC RBC-ENTMCNC: 32.2 PG — SIGNIFICANT CHANGE UP (ref 27–34)
MCHC RBC-ENTMCNC: 33.8 GM/DL — SIGNIFICANT CHANGE UP (ref 32–36)
MCV RBC AUTO: 95.3 FL — SIGNIFICANT CHANGE UP (ref 80–100)
NON HDL CHOLESTEROL: 117 MG/DL — SIGNIFICANT CHANGE UP
NRBC # BLD: 0 /100 WBCS — SIGNIFICANT CHANGE UP (ref 0–0)
PLATELET # BLD AUTO: 270 K/UL — SIGNIFICANT CHANGE UP (ref 150–400)
POTASSIUM SERPL-MCNC: 4.8 MMOL/L — SIGNIFICANT CHANGE UP (ref 3.5–5.3)
POTASSIUM SERPL-SCNC: 4.8 MMOL/L — SIGNIFICANT CHANGE UP (ref 3.5–5.3)
RBC # BLD: 3.82 M/UL — LOW (ref 4.2–5.8)
RBC # FLD: 13.6 % — SIGNIFICANT CHANGE UP (ref 10.3–14.5)
SODIUM SERPL-SCNC: 143 MMOL/L — SIGNIFICANT CHANGE UP (ref 135–145)
TRIGL SERPL-MCNC: 72 MG/DL — SIGNIFICANT CHANGE UP
WBC # BLD: 8.9 K/UL — SIGNIFICANT CHANGE UP (ref 3.8–10.5)
WBC # FLD AUTO: 8.9 K/UL — SIGNIFICANT CHANGE UP (ref 3.8–10.5)

## 2022-06-24 PROCEDURE — 71275 CT ANGIOGRAPHY CHEST: CPT | Mod: MC

## 2022-06-24 PROCEDURE — 85025 COMPLETE CBC W/AUTO DIFF WBC: CPT

## 2022-06-24 PROCEDURE — C1725: CPT

## 2022-06-24 PROCEDURE — 85027 COMPLETE CBC AUTOMATED: CPT

## 2022-06-24 PROCEDURE — 80048 BASIC METABOLIC PNL TOTAL CA: CPT

## 2022-06-24 PROCEDURE — 93306 TTE W/DOPPLER COMPLETE: CPT

## 2022-06-24 PROCEDURE — C1769: CPT

## 2022-06-24 PROCEDURE — 84484 ASSAY OF TROPONIN QUANT: CPT

## 2022-06-24 PROCEDURE — 93010 ELECTROCARDIOGRAM REPORT: CPT

## 2022-06-24 PROCEDURE — 83036 HEMOGLOBIN GLYCOSYLATED A1C: CPT

## 2022-06-24 PROCEDURE — C1874: CPT

## 2022-06-24 PROCEDURE — 93005 ELECTROCARDIOGRAM TRACING: CPT

## 2022-06-24 PROCEDURE — C1753: CPT

## 2022-06-24 PROCEDURE — C1894: CPT

## 2022-06-24 PROCEDURE — 80053 COMPREHEN METABOLIC PANEL: CPT

## 2022-06-24 PROCEDURE — 85347 COAGULATION TIME ACTIVATED: CPT

## 2022-06-24 PROCEDURE — 99239 HOSP IP/OBS DSCHRG MGMT >30: CPT

## 2022-06-24 PROCEDURE — 87635 SARS-COV-2 COVID-19 AMP PRB: CPT

## 2022-06-24 PROCEDURE — 83735 ASSAY OF MAGNESIUM: CPT

## 2022-06-24 PROCEDURE — 36415 COLL VENOUS BLD VENIPUNCTURE: CPT

## 2022-06-24 PROCEDURE — 74174 CTA ABD&PLVS W/CONTRAST: CPT | Mod: MC

## 2022-06-24 PROCEDURE — 83880 ASSAY OF NATRIURETIC PEPTIDE: CPT

## 2022-06-24 PROCEDURE — C1887: CPT

## 2022-06-24 PROCEDURE — 80061 LIPID PANEL: CPT

## 2022-06-24 PROCEDURE — 85610 PROTHROMBIN TIME: CPT

## 2022-06-24 PROCEDURE — 71045 X-RAY EXAM CHEST 1 VIEW: CPT

## 2022-06-24 PROCEDURE — 75574 CT ANGIO HRT W/3D IMAGE: CPT | Mod: MC

## 2022-06-24 PROCEDURE — 85730 THROMBOPLASTIN TIME PARTIAL: CPT

## 2022-06-24 PROCEDURE — 99285 EMERGENCY DEPT VISIT HI MDM: CPT

## 2022-06-24 RX ORDER — CLOPIDOGREL BISULFATE 75 MG/1
1 TABLET, FILM COATED ORAL
Qty: 30 | Refills: 11
Start: 2022-06-24 | End: 2023-06-18

## 2022-06-24 RX ORDER — ASPIRIN/CALCIUM CARB/MAGNESIUM 324 MG
1 TABLET ORAL
Qty: 30 | Refills: 11
Start: 2022-06-24 | End: 2023-06-18

## 2022-06-24 RX ORDER — ATORVASTATIN CALCIUM 80 MG/1
1 TABLET, FILM COATED ORAL
Qty: 30 | Refills: 0
Start: 2022-06-24 | End: 2022-07-23

## 2022-06-24 RX ORDER — ACETAMINOPHEN 500 MG
650 TABLET ORAL ONCE
Refills: 0 | Status: COMPLETED | OUTPATIENT
Start: 2022-06-24 | End: 2022-06-24

## 2022-06-24 RX ADMIN — Medication 650 MILLIGRAM(S): at 05:32

## 2022-06-24 RX ADMIN — Medication 81 MILLIGRAM(S): at 11:02

## 2022-06-24 RX ADMIN — CLOPIDOGREL BISULFATE 75 MILLIGRAM(S): 75 TABLET, FILM COATED ORAL at 11:02

## 2022-06-24 RX ADMIN — Medication 650 MILLIGRAM(S): at 06:15

## 2022-06-24 NOTE — DISCHARGE NOTE NURSING/CASE MANAGEMENT/SOCIAL WORK - PATIENT PORTAL LINK FT
You can access the FollowMyHealth Patient Portal offered by [facility] by registering at the following website: [email or web address]. By joining [facility]’s FollowMyHealth portal, you will also be able to view your health information using other applications (apps) compatible with our system.

## 2022-06-24 NOTE — DISCHARGE NOTE NURSING/CASE MANAGEMENT/SOCIAL WORK - NSDCPEFALRISK_GEN_ALL_CORE
For information on Fall & Injury Prevention, visit: [email or web address] OR  [email or web address] OR  https://www.cdc.gov/steadi/patient.html

## 2022-06-27 ENCOUNTER — TRANSCRIPTION ENCOUNTER (OUTPATIENT)
Age: 72
End: 2022-06-27

## 2022-06-27 ENCOUNTER — INPATIENT (INPATIENT)
Facility: HOSPITAL | Age: 72
LOS: 0 days | Discharge: HOME | DRG: 303 | End: 2022-06-27
Attending: INTERNAL MEDICINE | Admitting: INTERNAL MEDICINE
Payer: COMMERCIAL

## 2022-06-27 ENCOUNTER — EMERGENCY (EMERGENCY)
Facility: HOSPITAL | Age: 72
LOS: 1 days | Discharge: HOME | End: 2022-06-27
Attending: EMERGENCY MEDICINE | Admitting: EMERGENCY MEDICINE
Payer: COMMERCIAL

## 2022-06-27 VITALS
SYSTOLIC BLOOD PRESSURE: 144 MMHG | HEART RATE: 64 BPM | RESPIRATION RATE: 18 BRPM | OXYGEN SATURATION: 97 % | DIASTOLIC BLOOD PRESSURE: 72 MMHG

## 2022-06-27 VITALS
TEMPERATURE: 98 F | OXYGEN SATURATION: 99 % | HEIGHT: 68 IN | DIASTOLIC BLOOD PRESSURE: 94 MMHG | RESPIRATION RATE: 18 BRPM | HEART RATE: 70 BPM | SYSTOLIC BLOOD PRESSURE: 163 MMHG | WEIGHT: 192.02 LBS

## 2022-06-27 VITALS
OXYGEN SATURATION: 100 % | SYSTOLIC BLOOD PRESSURE: 105 MMHG | RESPIRATION RATE: 18 BRPM | DIASTOLIC BLOOD PRESSURE: 61 MMHG | TEMPERATURE: 98 F | HEART RATE: 68 BPM

## 2022-06-27 VITALS
OXYGEN SATURATION: 94 % | RESPIRATION RATE: 16 BRPM | HEART RATE: 62 BPM | DIASTOLIC BLOOD PRESSURE: 59 MMHG | WEIGHT: 192.02 LBS | TEMPERATURE: 98 F | SYSTOLIC BLOOD PRESSURE: 93 MMHG | HEIGHT: 68 IN

## 2022-06-27 DIAGNOSIS — R07.9 CHEST PAIN, UNSPECIFIED: ICD-10-CM

## 2022-06-27 DIAGNOSIS — Z98.890 OTHER SPECIFIED POSTPROCEDURAL STATES: Chronic | ICD-10-CM

## 2022-06-27 DIAGNOSIS — Z29.9 ENCOUNTER FOR PROPHYLACTIC MEASURES, UNSPECIFIED: ICD-10-CM

## 2022-06-27 DIAGNOSIS — I20.0 UNSTABLE ANGINA: ICD-10-CM

## 2022-06-27 DIAGNOSIS — I25.10 ATHEROSCLEROTIC HEART DISEASE OF NATIVE CORONARY ARTERY WITHOUT ANGINA PECTORIS: ICD-10-CM

## 2022-06-27 LAB
ALBUMIN SERPL ELPH-MCNC: 3.7 G/DL — SIGNIFICANT CHANGE UP (ref 3.3–5)
ALBUMIN SERPL ELPH-MCNC: 3.8 G/DL — SIGNIFICANT CHANGE UP (ref 3.3–5)
ALBUMIN SERPL ELPH-MCNC: 4.1 G/DL — SIGNIFICANT CHANGE UP (ref 3.3–5)
ALBUMIN SERPL ELPH-MCNC: 4.2 G/DL — SIGNIFICANT CHANGE UP (ref 3.3–5)
ALBUMIN SERPL ELPH-MCNC: 4.3 G/DL — SIGNIFICANT CHANGE UP (ref 3.3–5)
ALP SERPL-CCNC: 40 U/L — SIGNIFICANT CHANGE UP (ref 40–120)
ALP SERPL-CCNC: 41 U/L — SIGNIFICANT CHANGE UP (ref 40–120)
ALP SERPL-CCNC: 42 U/L — SIGNIFICANT CHANGE UP (ref 40–120)
ALP SERPL-CCNC: 43 U/L — SIGNIFICANT CHANGE UP (ref 40–120)
ALP SERPL-CCNC: SIGNIFICANT CHANGE UP (ref 40–120)
ALT FLD-CCNC: 17 U/L — SIGNIFICANT CHANGE UP (ref 10–45)
ALT FLD-CCNC: 18 U/L — SIGNIFICANT CHANGE UP (ref 10–45)
ALT FLD-CCNC: 18 U/L — SIGNIFICANT CHANGE UP (ref 10–45)
ALT FLD-CCNC: SIGNIFICANT CHANGE UP (ref 10–45)
ALT FLD-CCNC: SIGNIFICANT CHANGE UP (ref 10–45)
ANION GAP SERPL CALC-SCNC: 10 MMOL/L — SIGNIFICANT CHANGE UP (ref 5–17)
ANION GAP SERPL CALC-SCNC: 9 MMOL/L — SIGNIFICANT CHANGE UP (ref 5–17)
ANION GAP SERPL CALC-SCNC: 9 MMOL/L — SIGNIFICANT CHANGE UP (ref 5–17)
APTT BLD: 40 SEC — HIGH (ref 27.5–35.5)
AST SERPL-CCNC: 18 U/L — SIGNIFICANT CHANGE UP (ref 10–40)
AST SERPL-CCNC: 18 U/L — SIGNIFICANT CHANGE UP (ref 10–40)
AST SERPL-CCNC: 22 U/L — SIGNIFICANT CHANGE UP (ref 10–40)
AST SERPL-CCNC: SIGNIFICANT CHANGE UP (ref 10–40)
AST SERPL-CCNC: SIGNIFICANT CHANGE UP (ref 10–40)
BASOPHILS # BLD AUTO: 0.05 K/UL — SIGNIFICANT CHANGE UP (ref 0–0.2)
BASOPHILS # BLD AUTO: 0.05 K/UL — SIGNIFICANT CHANGE UP (ref 0–0.2)
BASOPHILS # BLD AUTO: 0.06 K/UL — SIGNIFICANT CHANGE UP (ref 0–0.2)
BASOPHILS NFR BLD AUTO: 0.6 % — SIGNIFICANT CHANGE UP (ref 0–2)
BILIRUB DIRECT SERPL-MCNC: <0.2 MG/DL — SIGNIFICANT CHANGE UP (ref 0–0.3)
BILIRUB INDIRECT FLD-MCNC: SIGNIFICANT CHANGE UP MG/DL (ref 0.2–1)
BILIRUB SERPL-MCNC: 0.4 MG/DL — SIGNIFICANT CHANGE UP (ref 0.2–1.2)
BILIRUB SERPL-MCNC: 0.5 MG/DL — SIGNIFICANT CHANGE UP (ref 0.2–1.2)
BILIRUB SERPL-MCNC: 0.5 MG/DL — SIGNIFICANT CHANGE UP (ref 0.2–1.2)
BUN SERPL-MCNC: 20 MG/DL — SIGNIFICANT CHANGE UP (ref 7–23)
BUN SERPL-MCNC: 22 MG/DL — SIGNIFICANT CHANGE UP (ref 7–23)
CALCIUM SERPL-MCNC: 8.9 MG/DL — SIGNIFICANT CHANGE UP (ref 8.4–10.5)
CALCIUM SERPL-MCNC: 9 MG/DL — SIGNIFICANT CHANGE UP (ref 8.4–10.5)
CALCIUM SERPL-MCNC: 9.3 MG/DL — SIGNIFICANT CHANGE UP (ref 8.4–10.5)
CALCIUM SERPL-MCNC: 9.7 MG/DL — SIGNIFICANT CHANGE UP (ref 8.4–10.5)
CALCIUM SERPL-MCNC: 9.8 MG/DL — SIGNIFICANT CHANGE UP (ref 8.4–10.5)
CHLORIDE SERPL-SCNC: 100 MMOL/L — SIGNIFICANT CHANGE UP (ref 96–108)
CHLORIDE SERPL-SCNC: 103 MMOL/L — SIGNIFICANT CHANGE UP (ref 96–108)
CHLORIDE SERPL-SCNC: 105 MMOL/L — SIGNIFICANT CHANGE UP (ref 96–108)
CHOLEST SERPL-MCNC: 116 MG/DL — SIGNIFICANT CHANGE UP
CK MB CFR SERPL CALC: 2 NG/ML — SIGNIFICANT CHANGE UP (ref 0–6.7)
CK SERPL-CCNC: 85 U/L — SIGNIFICANT CHANGE UP (ref 30–200)
CO2 SERPL-SCNC: 25 MMOL/L — SIGNIFICANT CHANGE UP (ref 22–31)
CO2 SERPL-SCNC: 27 MMOL/L — SIGNIFICANT CHANGE UP (ref 22–31)
CO2 SERPL-SCNC: 28 MMOL/L — SIGNIFICANT CHANGE UP (ref 22–31)
CO2 SERPL-SCNC: 29 MMOL/L — SIGNIFICANT CHANGE UP (ref 22–31)
CO2 SERPL-SCNC: 31 MMOL/L — SIGNIFICANT CHANGE UP (ref 22–31)
CREAT SERPL-MCNC: 0.85 MG/DL — SIGNIFICANT CHANGE UP (ref 0.5–1.3)
CREAT SERPL-MCNC: 0.86 MG/DL — SIGNIFICANT CHANGE UP (ref 0.5–1.3)
CREAT SERPL-MCNC: 0.9 MG/DL — SIGNIFICANT CHANGE UP (ref 0.5–1.3)
CREAT SERPL-MCNC: 0.91 MG/DL — SIGNIFICANT CHANGE UP (ref 0.5–1.3)
CREAT SERPL-MCNC: 0.92 MG/DL — SIGNIFICANT CHANGE UP (ref 0.5–1.3)
EGFR: 88 ML/MIN/1.73M2 — SIGNIFICANT CHANGE UP
EGFR: 90 ML/MIN/1.73M2 — SIGNIFICANT CHANGE UP
EGFR: 91 ML/MIN/1.73M2 — SIGNIFICANT CHANGE UP
EGFR: 92 ML/MIN/1.73M2 — SIGNIFICANT CHANGE UP
EGFR: 92 ML/MIN/1.73M2 — SIGNIFICANT CHANGE UP
EOSINOPHIL # BLD AUTO: 0.21 K/UL — SIGNIFICANT CHANGE UP (ref 0–0.5)
EOSINOPHIL # BLD AUTO: 0.3 K/UL — SIGNIFICANT CHANGE UP (ref 0–0.5)
EOSINOPHIL # BLD AUTO: 0.4 K/UL — SIGNIFICANT CHANGE UP (ref 0–0.5)
EOSINOPHIL NFR BLD AUTO: 2.2 % — SIGNIFICANT CHANGE UP (ref 0–6)
EOSINOPHIL NFR BLD AUTO: 3.8 % — SIGNIFICANT CHANGE UP (ref 0–6)
EOSINOPHIL NFR BLD AUTO: 4.4 % — SIGNIFICANT CHANGE UP (ref 0–6)
GLUCOSE SERPL-MCNC: 104 MG/DL — HIGH (ref 70–99)
GLUCOSE SERPL-MCNC: 105 MG/DL — HIGH (ref 70–99)
GLUCOSE SERPL-MCNC: 91 MG/DL — SIGNIFICANT CHANGE UP (ref 70–99)
GLUCOSE SERPL-MCNC: 93 MG/DL — SIGNIFICANT CHANGE UP (ref 70–99)
GLUCOSE SERPL-MCNC: 94 MG/DL — SIGNIFICANT CHANGE UP (ref 70–99)
HCT VFR BLD CALC: 34.8 % — LOW (ref 39–50)
HCT VFR BLD CALC: 36.2 % — LOW (ref 39–50)
HCT VFR BLD CALC: 37.4 % — LOW (ref 39–50)
HDLC SERPL-MCNC: 35 MG/DL — LOW
HGB BLD-MCNC: 11.7 G/DL — LOW (ref 13–17)
HGB BLD-MCNC: 12.4 G/DL — LOW (ref 13–17)
HGB BLD-MCNC: 12.7 G/DL — LOW (ref 13–17)
IMM GRANULOCYTES NFR BLD AUTO: 0.3 % — SIGNIFICANT CHANGE UP (ref 0–1.5)
INR BLD: 1.02 — SIGNIFICANT CHANGE UP (ref 0.88–1.16)
ISTAT ACTK (ACTIVATED CLOTTING TIME KAOLIN): 260 SEC — HIGH (ref 74–137)
ISTAT ACTK (ACTIVATED CLOTTING TIME KAOLIN): 260 SEC — HIGH (ref 74–137)
ISTAT ACTK (ACTIVATED CLOTTING TIME KAOLIN): 277 SEC — HIGH (ref 74–137)
ISTAT ACTK (ACTIVATED CLOTTING TIME KAOLIN): 277 SEC — HIGH (ref 74–137)
ISTAT ACTK (ACTIVATED CLOTTING TIME KAOLIN): 300 SEC — HIGH (ref 74–137)
ISTAT ACTK (ACTIVATED CLOTTING TIME KAOLIN): 300 SEC — HIGH (ref 74–137)
LIPID PNL WITH DIRECT LDL SERPL: 63 MG/DL — SIGNIFICANT CHANGE UP
LYMPHOCYTES # BLD AUTO: 1.41 K/UL — SIGNIFICANT CHANGE UP (ref 1–3.3)
LYMPHOCYTES # BLD AUTO: 1.77 K/UL — SIGNIFICANT CHANGE UP (ref 1–3.3)
LYMPHOCYTES # BLD AUTO: 14.7 % — SIGNIFICANT CHANGE UP (ref 13–44)
LYMPHOCYTES # BLD AUTO: 2.51 K/UL — SIGNIFICANT CHANGE UP (ref 1–3.3)
LYMPHOCYTES # BLD AUTO: 22.6 % — SIGNIFICANT CHANGE UP (ref 13–44)
LYMPHOCYTES # BLD AUTO: 27.8 % — SIGNIFICANT CHANGE UP (ref 13–44)
MAGNESIUM SERPL-MCNC: 2.2 MG/DL — SIGNIFICANT CHANGE UP (ref 1.6–2.6)
MCHC RBC-ENTMCNC: 32 PG — SIGNIFICANT CHANGE UP (ref 27–34)
MCHC RBC-ENTMCNC: 32.4 PG — SIGNIFICANT CHANGE UP (ref 27–34)
MCHC RBC-ENTMCNC: 32.6 PG — SIGNIFICANT CHANGE UP (ref 27–34)
MCHC RBC-ENTMCNC: 33.6 GM/DL — SIGNIFICANT CHANGE UP (ref 32–36)
MCHC RBC-ENTMCNC: 34 GM/DL — SIGNIFICANT CHANGE UP (ref 32–36)
MCHC RBC-ENTMCNC: 34.3 GM/DL — SIGNIFICANT CHANGE UP (ref 32–36)
MCV RBC AUTO: 94.5 FL — SIGNIFICANT CHANGE UP (ref 80–100)
MCV RBC AUTO: 95.1 FL — SIGNIFICANT CHANGE UP (ref 80–100)
MCV RBC AUTO: 95.9 FL — SIGNIFICANT CHANGE UP (ref 80–100)
MONOCYTES # BLD AUTO: 0.73 K/UL — SIGNIFICANT CHANGE UP (ref 0–0.9)
MONOCYTES # BLD AUTO: 0.82 K/UL — SIGNIFICANT CHANGE UP (ref 0–0.9)
MONOCYTES # BLD AUTO: 0.85 K/UL — SIGNIFICANT CHANGE UP (ref 0–0.9)
MONOCYTES NFR BLD AUTO: 8.9 % — SIGNIFICANT CHANGE UP (ref 2–14)
MONOCYTES NFR BLD AUTO: 9.1 % — SIGNIFICANT CHANGE UP (ref 2–14)
MONOCYTES NFR BLD AUTO: 9.3 % — SIGNIFICANT CHANGE UP (ref 2–14)
NEUTROPHILS # BLD AUTO: 4.96 K/UL — SIGNIFICANT CHANGE UP (ref 1.8–7.4)
NEUTROPHILS # BLD AUTO: 5.21 K/UL — SIGNIFICANT CHANGE UP (ref 1.8–7.4)
NEUTROPHILS # BLD AUTO: 7.01 K/UL — SIGNIFICANT CHANGE UP (ref 1.8–7.4)
NEUTROPHILS NFR BLD AUTO: 57.8 % — SIGNIFICANT CHANGE UP (ref 43–77)
NEUTROPHILS NFR BLD AUTO: 63.4 % — SIGNIFICANT CHANGE UP (ref 43–77)
NEUTROPHILS NFR BLD AUTO: 73.3 % — SIGNIFICANT CHANGE UP (ref 43–77)
NON HDL CHOLESTEROL: 81 MG/DL — SIGNIFICANT CHANGE UP
NRBC # BLD: 0 /100 WBCS — SIGNIFICANT CHANGE UP (ref 0–0)
NT-PROBNP SERPL-SCNC: 94 PG/ML — SIGNIFICANT CHANGE UP (ref 0–300)
PLATELET # BLD AUTO: 256 K/UL — SIGNIFICANT CHANGE UP (ref 150–400)
PLATELET # BLD AUTO: 277 K/UL — SIGNIFICANT CHANGE UP (ref 150–400)
PLATELET # BLD AUTO: 278 K/UL — SIGNIFICANT CHANGE UP (ref 150–400)
POTASSIUM SERPL-MCNC: 4.2 MMOL/L — SIGNIFICANT CHANGE UP (ref 3.5–5.3)
POTASSIUM SERPL-MCNC: 5 MMOL/L — SIGNIFICANT CHANGE UP (ref 3.5–5.3)
POTASSIUM SERPL-MCNC: 5.2 MMOL/L — SIGNIFICANT CHANGE UP (ref 3.5–5.3)
POTASSIUM SERPL-MCNC: SIGNIFICANT CHANGE UP (ref 3.5–5.3)
POTASSIUM SERPL-MCNC: SIGNIFICANT CHANGE UP (ref 3.5–5.3)
POTASSIUM SERPL-SCNC: 4.2 MMOL/L — SIGNIFICANT CHANGE UP (ref 3.5–5.3)
POTASSIUM SERPL-SCNC: 5 MMOL/L — SIGNIFICANT CHANGE UP (ref 3.5–5.3)
POTASSIUM SERPL-SCNC: 5.2 MMOL/L — SIGNIFICANT CHANGE UP (ref 3.5–5.3)
POTASSIUM SERPL-SCNC: SIGNIFICANT CHANGE UP (ref 3.5–5.3)
POTASSIUM SERPL-SCNC: SIGNIFICANT CHANGE UP (ref 3.5–5.3)
PROT SERPL-MCNC: 6.5 G/DL — SIGNIFICANT CHANGE UP (ref 6–8.3)
PROT SERPL-MCNC: 6.8 G/DL — SIGNIFICANT CHANGE UP (ref 6–8.3)
PROT SERPL-MCNC: 6.9 G/DL — SIGNIFICANT CHANGE UP (ref 6–8.3)
PROT SERPL-MCNC: 7.2 G/DL — SIGNIFICANT CHANGE UP (ref 6–8.3)
PROT SERPL-MCNC: 7.3 G/DL — SIGNIFICANT CHANGE UP (ref 6–8.3)
PROTHROM AB SERPL-ACNC: 12.2 SEC — SIGNIFICANT CHANGE UP (ref 10.5–13.4)
RBC # BLD: 3.66 M/UL — LOW (ref 4.2–5.8)
RBC # BLD: 3.83 M/UL — LOW (ref 4.2–5.8)
RBC # BLD: 3.9 M/UL — LOW (ref 4.2–5.8)
RBC # FLD: 13.7 % — SIGNIFICANT CHANGE UP (ref 10.3–14.5)
RBC # FLD: 13.9 % — SIGNIFICANT CHANGE UP (ref 10.3–14.5)
RBC # FLD: 13.9 % — SIGNIFICANT CHANGE UP (ref 10.3–14.5)
SARS-COV-2 RNA SPEC QL NAA+PROBE: NEGATIVE — SIGNIFICANT CHANGE UP
SARS-COV-2 RNA SPEC QL NAA+PROBE: NEGATIVE — SIGNIFICANT CHANGE UP
SODIUM SERPL-SCNC: 139 MMOL/L — SIGNIFICANT CHANGE UP (ref 135–145)
SODIUM SERPL-SCNC: 141 MMOL/L — SIGNIFICANT CHANGE UP (ref 135–145)
SODIUM SERPL-SCNC: 144 MMOL/L — SIGNIFICANT CHANGE UP (ref 135–145)
TRIGL SERPL-MCNC: 88 MG/DL — SIGNIFICANT CHANGE UP
TROPONIN T SERPL-MCNC: 0.03 NG/ML — HIGH (ref 0–0.01)
TROPONIN T SERPL-MCNC: 0.04 NG/ML — CRITICAL HIGH (ref 0–0.01)
TROPONIN T SERPL-MCNC: 0.05 NG/ML — CRITICAL HIGH (ref 0–0.01)
WBC # BLD: 7.83 K/UL — SIGNIFICANT CHANGE UP (ref 3.8–10.5)
WBC # BLD: 9.02 K/UL — SIGNIFICANT CHANGE UP (ref 3.8–10.5)
WBC # BLD: 9.57 K/UL — SIGNIFICANT CHANGE UP (ref 3.8–10.5)
WBC # FLD AUTO: 7.83 K/UL — SIGNIFICANT CHANGE UP (ref 3.8–10.5)
WBC # FLD AUTO: 9.02 K/UL — SIGNIFICANT CHANGE UP (ref 3.8–10.5)
WBC # FLD AUTO: 9.57 K/UL — SIGNIFICANT CHANGE UP (ref 3.8–10.5)

## 2022-06-27 PROCEDURE — 93010 ELECTROCARDIOGRAM REPORT: CPT | Mod: 59

## 2022-06-27 PROCEDURE — 36415 COLL VENOUS BLD VENIPUNCTURE: CPT

## 2022-06-27 PROCEDURE — 82248 BILIRUBIN DIRECT: CPT

## 2022-06-27 PROCEDURE — 85610 PROTHROMBIN TIME: CPT

## 2022-06-27 PROCEDURE — 82553 CREATINE MB FRACTION: CPT

## 2022-06-27 PROCEDURE — 99285 EMERGENCY DEPT VISIT HI MDM: CPT | Mod: 25

## 2022-06-27 PROCEDURE — 99285 EMERGENCY DEPT VISIT HI MDM: CPT

## 2022-06-27 PROCEDURE — 93010 ELECTROCARDIOGRAM REPORT: CPT

## 2022-06-27 PROCEDURE — 84484 ASSAY OF TROPONIN QUANT: CPT

## 2022-06-27 PROCEDURE — 83880 ASSAY OF NATRIURETIC PEPTIDE: CPT

## 2022-06-27 PROCEDURE — 82962 GLUCOSE BLOOD TEST: CPT

## 2022-06-27 PROCEDURE — 83735 ASSAY OF MAGNESIUM: CPT

## 2022-06-27 PROCEDURE — 85730 THROMBOPLASTIN TIME PARTIAL: CPT

## 2022-06-27 PROCEDURE — 85025 COMPLETE CBC W/AUTO DIFF WBC: CPT

## 2022-06-27 PROCEDURE — 93005 ELECTROCARDIOGRAM TRACING: CPT

## 2022-06-27 PROCEDURE — 82550 ASSAY OF CK (CPK): CPT

## 2022-06-27 PROCEDURE — 71045 X-RAY EXAM CHEST 1 VIEW: CPT | Mod: 26

## 2022-06-27 PROCEDURE — 71045 X-RAY EXAM CHEST 1 VIEW: CPT | Mod: 26,77

## 2022-06-27 PROCEDURE — 80053 COMPREHEN METABOLIC PANEL: CPT

## 2022-06-27 PROCEDURE — 71045 X-RAY EXAM CHEST 1 VIEW: CPT

## 2022-06-27 PROCEDURE — 70450 CT HEAD/BRAIN W/O DYE: CPT | Mod: MA

## 2022-06-27 PROCEDURE — 87635 SARS-COV-2 COVID-19 AMP PRB: CPT

## 2022-06-27 PROCEDURE — 99222 1ST HOSP IP/OBS MODERATE 55: CPT

## 2022-06-27 PROCEDURE — 80061 LIPID PANEL: CPT

## 2022-06-27 PROCEDURE — 70450 CT HEAD/BRAIN W/O DYE: CPT | Mod: 26,MA

## 2022-06-27 RX ORDER — ISOSORBIDE MONONITRATE 60 MG/1
1 TABLET, EXTENDED RELEASE ORAL
Qty: 30 | Refills: 3
Start: 2022-06-27 | End: 2022-10-24

## 2022-06-27 RX ORDER — CLOPIDOGREL BISULFATE 75 MG/1
75 TABLET, FILM COATED ORAL DAILY
Refills: 0 | Status: DISCONTINUED | OUTPATIENT
Start: 2022-06-27 | End: 2022-06-27

## 2022-06-27 RX ORDER — PANTOPRAZOLE SODIUM 20 MG/1
40 TABLET, DELAYED RELEASE ORAL
Refills: 0 | Status: DISCONTINUED | OUTPATIENT
Start: 2022-06-28 | End: 2022-06-27

## 2022-06-27 RX ORDER — ENOXAPARIN SODIUM 100 MG/ML
40 INJECTION SUBCUTANEOUS EVERY 24 HOURS
Refills: 0 | Status: DISCONTINUED | OUTPATIENT
Start: 2022-06-27 | End: 2022-06-27

## 2022-06-27 RX ORDER — ASPIRIN/CALCIUM CARB/MAGNESIUM 324 MG
81 TABLET ORAL DAILY
Refills: 0 | Status: DISCONTINUED | OUTPATIENT
Start: 2022-06-27 | End: 2022-06-27

## 2022-06-27 RX ORDER — SODIUM CHLORIDE 9 MG/ML
500 INJECTION INTRAMUSCULAR; INTRAVENOUS; SUBCUTANEOUS ONCE
Refills: 0 | Status: COMPLETED | OUTPATIENT
Start: 2022-06-27 | End: 2022-06-27

## 2022-06-27 RX ORDER — ATORVASTATIN CALCIUM 80 MG/1
40 TABLET, FILM COATED ORAL AT BEDTIME
Refills: 0 | Status: DISCONTINUED | OUTPATIENT
Start: 2022-06-27 | End: 2022-06-27

## 2022-06-27 RX ORDER — ISOSORBIDE MONONITRATE 60 MG/1
30 TABLET, EXTENDED RELEASE ORAL DAILY
Refills: 0 | Status: DISCONTINUED | OUTPATIENT
Start: 2022-06-27 | End: 2022-06-27

## 2022-06-27 RX ORDER — PANTOPRAZOLE SODIUM 20 MG/1
1 TABLET, DELAYED RELEASE ORAL
Qty: 30 | Refills: 0
Start: 2022-06-27 | End: 2022-07-26

## 2022-06-27 RX ORDER — ISOSORBIDE MONONITRATE 60 MG/1
1 TABLET, EXTENDED RELEASE ORAL
Qty: 0 | Refills: 0 | DISCHARGE
Start: 2022-06-27

## 2022-06-27 RX ADMIN — SODIUM CHLORIDE 500 MILLILITER(S): 9 INJECTION INTRAMUSCULAR; INTRAVENOUS; SUBCUTANEOUS at 21:35

## 2022-06-27 RX ADMIN — CLOPIDOGREL BISULFATE 75 MILLIGRAM(S): 75 TABLET, FILM COATED ORAL at 11:05

## 2022-06-27 RX ADMIN — SODIUM CHLORIDE 500 MILLILITER(S): 9 INJECTION INTRAMUSCULAR; INTRAVENOUS; SUBCUTANEOUS at 16:52

## 2022-06-27 RX ADMIN — Medication 81 MILLIGRAM(S): at 11:05

## 2022-06-27 RX ADMIN — ISOSORBIDE MONONITRATE 30 MILLIGRAM(S): 60 TABLET, EXTENDED RELEASE ORAL at 11:16

## 2022-06-27 NOTE — ED PROVIDER NOTE - PROGRESS NOTE DETAILS
[place]: Hgb 12.4 at baseline, K/AST/ALT hemolyzed, trop minimally elevated at 0.05, other labs grossly wnl. CXR pending. PT currently feeling much better but still has a "feeling" in his chest. Will admit cards for further care. Updated pt.

## 2022-06-27 NOTE — ED PROVIDER NOTE - PROGRESS NOTE DETAILS
[gwendolyn] cards consulted. suspects like imdur causing hotn. reccs to hold imdur. may continue other Rx. no indication for inpatient hospitalization at this time. already w/ outpatient fu. pt/family feel safe going home. strict return precautions.

## 2022-06-27 NOTE — ED PROVIDER NOTE - OBJECTIVE STATEMENT
72M PMH CAD p/w SOB/CP. Pt had presented a few days ago w/ CP/lightheadedness. Eventually underwent cath 4d ago "s/p JUNO x 1 mRCA (85%); LM normal, mLAD mild diffuse, LCx normal, D1 mild dz, RPDA mild dz, EDP 8, access R TR" Pt states that lsat night while seated he had episode of chest burning and SOB, eventually resolved. However had another episode when he woke up ~0300. Currently still feels mild SOB but CP completely resolved. No other systemic symptoms.   Denies associated nausea, vomiting, diarrhea, lightheaded, diaphoresis, palpitations, cough, rhinorrhea, black stool, bloody stool, LE pain, LE swelling, focal weakness/numbness, abd pain, urinary complaints, f/c.

## 2022-06-27 NOTE — ED ADULT TRIAGE NOTE - CHIEF COMPLAINT QUOTE
Pt BIBEMS from home for evaluation of multiple syncopal episodes PTA w/ +LOC. Pt recently had stent placed 3 days ago, placed on Eliquis. Pt was seen in ED this AM for CP. Denies current CP. Pt c/o weakness. Denies fever, chills, CP, SOB, NVD.

## 2022-06-27 NOTE — ED ADULT NURSE NOTE - OBJECTIVE STATEMENT
.  72years male alert mental state (AOX3) received by EMS.  -Allergy: N/A.  -complain of syncope.  pt was discharged from the [facility] ED in today morning. pt did several syncope at  home.  pt presents weakness and lightheadedness.  -denied chest pain, SOB, n/v/d, abdomen pain, legs swelling.  Pt is in the bed comfortably at this time. Will continue to monitor and document any changes.

## 2022-06-27 NOTE — DISCHARGE NOTE PROVIDER - NSDCCPCAREPLAN_GEN_ALL_CORE_FT
PRINCIPAL DISCHARGE DIAGNOSIS  Diagnosis: Chest pain  Assessment and Plan of Treatment:        PRINCIPAL DISCHARGE DIAGNOSIS  Diagnosis: Chest pain  Assessment and Plan of Treatment: You came in with chest pain. You had an EKG which remained unchanged from prior admission. You had a stent placed in your right coronary artery during your prior admission, and other arteries revealed nonobstructive coronary artery disease. You also had an echocardiogram (ultrasound of heart) on your prior admission which was within normal limits. You were started on Imdur (isosorbide mononitrate) 30mg orally daily which should help with your symptoms. Please continue your aspirin, Plavix, and atorvastatin as directed.   Please follow up with your cardiologist in 1-2 weeks for further management.

## 2022-06-27 NOTE — H&P ADULT - PROBLEM SELECTOR PLAN 1
hx of UA  s/p recent Cardiac Cath on 6/23/22 s/p JUNO mRCA via RRA TR     on Aspirin Ec 81mg PO daily, Plavix 75mg PO daily and Atorvastatin 40mg PO daily.  TTE 6/23/22 normal EF 55-60% with no significant valvular disease

## 2022-06-27 NOTE — ED ADULT TRIAGE NOTE - OTHER COMPLAINTS
CC of chest pain & SOB x 45 mins ago. As per pt ~2200H yesterday he felt the symptoms and brushed it off for it is not as intense but swhen he is about to urinate suddenly he felt that there is a pressure in his chest during inspiration and feeling of heart burn. S/p RCA stenting 23 June 2022.

## 2022-06-27 NOTE — DISCHARGE NOTE PROVIDER - HOSPITAL COURSE
73 y/o male with hx of CAD s/p Cardiac Catheterization on 6/23/22 @[facility] JUNO x 1 mRCA (85%); LM normal, mLAD mild diffuse, LCx normal, D1 mild dz, RPDA mild dz, EDP 8, access R TR, Echocardiogram 6/23/22 EF 55-60%, trace AI and trival pericardial effusion presents to [facility] ER this morning, 6/27/22, with two episodes of chest "burning" sensation associated with SOB and tingling sensation left hand. In speaking with patient, he reports last night around 10PM while sitting down watching TV, he suddenly experienced diffuse midsternal "burning" sensation chest pain, rated 4/10, associated with SOB and left hand tingling.  Pt. states episode was short lived, lasting 3 minutes, resolving without taking medication, second episode occurred at 3AM with same intensity waking him up from sleep.  Pt. decided to come to ER for further evaluation.  He reports compliance with medication and is currently cp free.  He denies fever, chills, HA, cp, SOB, dizziness, diaphoresis, abdominal discomfort and n/v/d. In ECG reveals NSR@66bpm with non specific ST-T wave changes (unchanged from prior ECG on 6/23/22), Troponin 0.05), H/H 12.4/36.2 and COVID-19 non detected. In light of patient's symptoms and recent Cardiac Cath on 6/23/22 @[facility] JUNO mLAD pt. is now admitted to [facility] under cardiology for further evaluation.       73 y/o male with hx of CAD s/p Cardiac Catheterization on 6/23/22 @[facility] JUNO x 1 mRCA (85%); LM normal, mLAD mild diffuse, LCx normal, D1 mild dz, RPDA mild dz, EDP 8, access R TR, Echocardiogram 6/23/22 EF 55-60%, trace AI and trival pericardial effusion presents to [facility] ER this morning, 6/27/22, with two episodes of chest "burning" sensation associated with SOB and tingling sensation left hand. In speaking with patient, he reports last night around 10PM while sitting down watching TV, he suddenly experienced diffuse midsternal "burning" sensation chest pain, rated 4/10, associated with SOB and left hand tingling.  Pt. states episode was short lived, lasting 3 minutes, resolving without taking medication, second episode occurred at 3AM with same intensity waking him up from sleep.  Pt. decided to come to ER for further evaluation.  He reports compliance with medication and is currently cp free.  He denies fever, chills, HA, cp, SOB, dizziness, diaphoresis, abdominal discomfort and n/v/d. In ECG reveals NSR@66bpm with non specific ST-T wave changes (unchanged from prior ECG on 6/23/22), Troponin 0.05, most likely in the setting of recent cath), H/H 12.4/36.2 and COVID-19 non detected. In light of patient's symptoms and recent Cardiac Cath on 6/23/22 @[facility] JUNO mLAD pt. is now admitted to [facility] under cardiology for further evaluation. Started on Imdur 30mg QD for anti-anginal and anti-hypertensive effects. Overnight, no events. This AM, feels well, denies CP, SOB, n/v/d, LE edema. VSS, no events on tele, labs reviewed. PE unremarkable. Patient was seen and examined by attending who agrees with above d/c plan. All meds rx to pharmacy and explained to patient. Patient instructed to return if sx worsen including not limited to chest pain, shortness of breath       71 y/o male with hx of CAD s/p Cardiac Catheterization on 6/23/22 @[facility] JUNO x 1 mRCA (85%); LM normal, mLAD mild diffuse, LCx normal, D1 mild dz, RPDA mild dz, EDP 8, access R TR, Echocardiogram 6/23/22 EF 55-60%, trace AI and trival pericardial effusion presents to [facility] ER this morning, 6/27/22, with two episodes of chest "burning" sensation associated with SOB and tingling sensation left hand. In speaking with patient, he reports last night around 10PM while sitting down watching TV, he suddenly experienced diffuse midsternal "burning" sensation chest pain, rated 4/10, associated with SOB and left hand tingling.  Pt. states episode was short lived, lasting 3 minutes, resolving without taking medication, second episode occurred at 3AM with same intensity waking him up from sleep.  Pt. decided to come to ER for further evaluation.  He reports compliance with medication and is currently cp free.  He denies fever, chills, HA, cp, SOB, dizziness, diaphoresis, abdominal discomfort and n/v/d. In ECG reveals NSR@66bpm with non specific ST-T wave changes (unchanged from prior ECG on 6/23/22), Troponin 0.05>0.04, most likely in the setting of recent cath), H/H 12.4/36.2 and COVID-19 non detected. In light of patient's symptoms and recent Cardiac Cath on 6/23/22 @[facility] JUNO mLAD pt. is now admitted to [facility] under cardiology for further evaluation. Started on Imdur 30mg QD for anti-anginal and anti-hypertensive effects. Overnight, no events. This AM, feels well, denies CP, SOB, n/v/d, LE edema. VSS, no events on tele, labs reviewed. PE unremarkable. Patient was seen and examined by attending who agrees with above d/c plan. All meds rx to pharmacy and explained to patient. Patient instructed to return if sx worsen including not limited to chest pain, shortness of breath

## 2022-06-27 NOTE — H&P ADULT - ASSESSMENT
71 y/o male with hx of CAD s/p Cardiac Catheterization on 6/23/22 @[facility] JUNO x 1 mRCA (85%); LM normal, mLAD mild diffuse, LCx normal, D1 mild dz, RPDA mild dz, EDP 8, access R TR, Echocardiogram 6/23/22 EF 55-60%, trace AI and trival pericardial effusion presents to [facility] ER this morning, 6/27/22, with two episodes of chest "burning" sensation associated with SOB.

## 2022-06-27 NOTE — DISCHARGE NOTE PROVIDER - CARE PROVIDER_API CALL
Feliz Smith  CARDIOVASCULAR DISEASE  [address] [place] [address], [place] [address]  Phone: [phone]  Fax: [phone]  Follow Up Time: 1 week

## 2022-06-27 NOTE — ED ADULT NURSE NOTE - OBJECTIVE STATEMENT
pt. with pmh of CAD, s/p cardiac stent 3 days ago, presents with c/o discomfort to chest, Lt arm, shortness of breath, lightheadedness since 10 pm last night getting worse. pt. is A&Ox3, communicates w/o difficulty, skin warm dry, lungs clear, EKG NSR.

## 2022-06-27 NOTE — H&P ADULT - PROBLEM SELECTOR PLAN 2
s/p Cardiac Cath 6/23/22  JUNO mRCA via RRA TR band  on ASA Ec 81mg PO daily, Plavix 75mg PO daily, Atorvastatin 40mg PO daily

## 2022-06-27 NOTE — H&P ADULT - NSHPPHYSICALEXAM_GEN_ALL_CORE
T(C): 36.7 (06-27-22 @ 06:11), Max: 36.7 (06-27-22 @ 06:11)  HR: 67 (06-27-22 @ 06:11) (67 - 70)  BP: 152/67 (06-27-22 @ 06:11) (152/67 - 163/94)  RR: 18 (06-27-22 @ 06:11) (18 - 18)  SpO2: 98% (06-27-22 @ 06:11) (98% - 99%)  Wt(kg): --    Appearance: Normal	  HEENT:   Normal oral mucosa, PERRL, EOMI	  Neck: Supple,  - JVD; No Carotid Bruit and 2+ pulses B/L  Cardiovascular: Normal S1 S2, No JVD, No murmurs  Respiratory: Lungs clear to auscultation//No Rales, Rhonchi, Wheezing	  Gastrointestinal:  Soft, Non-tender, + BS	  Skin: No rashes, No ecchymoses, No cyanosis  Extremities: Normal range of motion, No clubbing, cyanosis or edema  Vascular: Femoral pulses 2+ b/l without bruit, DP 1+ b/l, PT 1+ b/l  Neurologic: Non-focal  Psychiatry: A & O x 3, Mood & affect appropriate

## 2022-06-27 NOTE — H&P ADULT - REASON FOR ADMISSION
2 episodes Intermittent, non radiating chest burning sensation associated with SOB this morning.  Pt. is s/p recent cardiac cath 6/23/22 JUNO mRCA 2 episodes Intermittent, non radiating diffuse chest burning sensation associated with SOB this morning.  Pt. is s/p recent cardiac cath 6/23/22 JUNO mRCA

## 2022-06-27 NOTE — ED PROVIDER NOTE - CLINICAL SUMMARY MEDICAL DECISION MAKING FREE TEXT BOX
72M PMH CAD p/w SOB/CP. Pt had presented a few days ago w/ CP/lightheadedness. Eventually underwent cath 4d ago "s/p JUNO x 1 mRCA (85%); LM normal, mLAD mild diffuse, LCx normal, D1 mild dz, RPDA mild dz, EDP 8, access R TR" Pt states that lsat night while seated he had episode of chest burning and SOB, eventually resolved. However had another episode when he woke up ~0300. Currently still feels mild SOB but CP completely resolved. No other systemic symptoms.   Mild HTN, other vitals wnl. Exam as above.  ddx: Low suspicion ACS. Clinically not PE, tamponade, dissection, PTX, perf, myocarditis, mediastinitis.   Labs, XR, reassess.

## 2022-06-27 NOTE — CONSULT NOTE ADULT - SUBJECTIVE AND OBJECTIVE BOX
HPI:  73 y/o male with hx of CAD s/p Cardiac Catheterization on 6/23/22 @[facility] JUNO x 1 mRCA (85%); LM normal, mLAD mild diffuse, LCx normal, D1 mild dz, RPDA mild dz, EDP 8, access R TR, Echocardiogram 6/23/22 EF 55-60%, trace AI and trival pericardial effusion who presented to the ED with chest pain last night. In light of patient's symptoms and recent Cardiac Cath, he was admitted to [facility] under cardiology for further evaluation of chest pain. He was ruled out for ACS. Started on Imdur 30mg QD for anti-anginal and anti-hypertensive effects and discharged home this afternoon as his workup was negative.  After discharge, he was sitting on toilet, had bm, then felt lightheaded/dizzy/sweaty. Got up and sat on couch. Felt he needed to have bm again so spouse helped him to toilet. Passed out for 4-5 seconds per his wife. She went to call 911, he had another larger loose bm and passed out again. On EMS arrival, his BP was very low. He denies syncopal events prior to this. Of note, his usual pressures run low. He took his Imdur prior to discharge today and was not hydrating well while in the hospital.       PAST MEDICAL & SURGICAL HISTORY:  CAD (coronary artery disease)  s/p Cardiac Cath 6/23/22 JUNO mRCA      Hyperlipidemia      S/P arthroscopic knee surgery          SOCIAL HISTORY:  FAMILY HISTORY:  No pertinent family history in first degree relatives        ALLERGIES: 	  No Known Allergies            MEDICATIONS:      PHYSICAL EXAM:  T(C): 36.7 (06-27-22 @ 22:28), Max: 36.7 (06-27-22 @ 06:11)  HR: 68 (06-27-22 @ 22:28) (58 - 70)  BP: 105/61 (06-27-22 @ 22:28) (93/59 - 165/84)  RR: 18 (06-27-22 @ 22:28) (16 - 18)  SpO2: 100% (06-27-22 @ 22:28) (94% - 100%)  Wt(kg): --    GEN: Awake, comfortable. NAD.   HEENT: NCAT, PERRL, EOMI. Mucosa moist. No JVD.   RESP: CTA b/l  CV: RRR, normal s1/s2. No m/r/g.  ABD: Soft, NTND. BS+  EXT: Warm. No edema, clubbing, or cyanosis.   NEURO: AAOx3. No focal deficits.    I&O's Summary    Height (cm): 172.7 (06-27 @ 16:18), 172.7 (06-27 @ 03:53)  Weight (kg): 87.1 (06-27 @ 16:18), 87.1 (06-27 @ 03:53)  BMI (kg/m2): 29.2 (06-27 @ 16:18), 29.2 (06-27 @ 03:53)  BSA (m2): 2.01 (06-27 @ 16:18), 2.01 (06-27 @ 03:53)  	  LABS:	 	    CARDIAC MARKERS:                                  11.7   9.57  )-----------( 256      ( 27 Jun 2022 16:49 )             34.8     06-27    139  |  105  |  22  ----------------------------<  105<H>  5.2   |  25  |  0.91    Ca    9.0      27 Jun 2022 18:12  Mg     2.2     06-27    TPro  6.5  /  Alb  3.7  /  TBili  0.4  /  DBili  x   /  AST  22  /  ALT  17  /  AlkPhos  40  06-27    proBNP: Serum Pro-Brain Natriuretic Peptide: 94 pg/mL (06-27 @ 16:49)    Lipid Profile:   HgA1c:   TSH:     TELEMETRY: 	    ECG:  	  RADIOLOGY:   ECHO:  STRESS:  CATH:   HPI:  71 y/o male with hx of CAD s/p Cardiac Catheterization on 6/23/22 @[facility] JUNO x 1 mRCA (85%); LM normal, mLAD mild diffuse, LCx normal, D1 mild dz, RPDA mild dz who presented to the ED with chest pain last night. In light of patient's symptoms and recent Cardiac Cath, he was admitted to [facility] under cardiology for further evaluation of chest pain. He was ruled out for ACS. Started on Imdur 30mg QD for anti-anginal and anti-hypertensive effects and discharged home this afternoon as his workup was negative.  After discharge, he was sitting on toilet, had bm, then felt lightheaded/dizzy/sweaty. Got up and sat on couch. Felt he needed to have bm again so spouse helped him to toilet. Passed out for 4-5 seconds per his wife. She went to call 911, he had another larger loose bm and passed out again. On EMS arrival, his BP was very low. He denies syncopal events prior to this. Of note, his usual pressures run low. He took his Imdur prior to discharge today and was not hydrating well while in the hospital. he denied any chest pain/jaw pain/ SOB prior to syncope.       PAST MEDICAL & SURGICAL HISTORY:  CAD (coronary artery disease)  s/p Cardiac Cath 6/23/22 JUNO mRCA      Hyperlipidemia      S/P arthroscopic knee surgery          SOCIAL HISTORY:  FAMILY HISTORY:  No pertinent family history in first degree relatives        ALLERGIES: 	  No Known Allergies            MEDICATIONS:      PHYSICAL EXAM:  T(C): 36.7 (06-27-22 @ 22:28), Max: 36.7 (06-27-22 @ 06:11)  HR: 68 (06-27-22 @ 22:28) (58 - 70)  BP: 105/61 (06-27-22 @ 22:28) (93/59 - 165/84)  RR: 18 (06-27-22 @ 22:28) (16 - 18)  SpO2: 100% (06-27-22 @ 22:28) (94% - 100%)  Wt(kg): --    GEN: Awake, comfortable. NAD.   HEENT: NCAT, PERRL, EOMI. Mucosa moist. No JVD.   RESP: CTA b/l  CV: RRR, normal s1/s2. No m/r/g.  ABD: Soft, NTND. BS+  EXT: Warm. No edema, clubbing, or cyanosis.   NEURO: AAOx3. No focal deficits.    I&O's Summary    Height (cm): 172.7 (06-27 @ 16:18), 172.7 (06-27 @ 03:53)  Weight (kg): 87.1 (06-27 @ 16:18), 87.1 (06-27 @ 03:53)  BMI (kg/m2): 29.2 (06-27 @ 16:18), 29.2 (06-27 @ 03:53)  BSA (m2): 2.01 (06-27 @ 16:18), 2.01 (06-27 @ 03:53)  	  LABS:	 	    CARDIAC MARKERS:                                  11.7   9.57  )-----------( 256      ( 27 Jun 2022 16:49 )             34.8     06-27    139  |  105  |  22  ----------------------------<  105<H>  5.2   |  25  |  0.91    Ca    9.0      27 Jun 2022 18:12  Mg     2.2     06-27    TPro  6.5  /  Alb  3.7  /  TBili  0.4  /  DBili  x   /  AST  22  /  ALT  17  /  AlkPhos  40  06-27    proBNP: Serum Pro-Brain Natriuretic Peptide: 94 pg/mL (06-27 @ 16:49)

## 2022-06-27 NOTE — CONSULT NOTE ADULT - ASSESSMENT
71 y/o male with hx of CAD s/p Cardiac Catheterization on 6/23/22 @[facility] JUNO x 1 mRCA (85%) who presented to the ED with chest pain last night is now returning for syncopal event after having BM. Cardiology consulted for sycnope    #Syncope   Classic vasovagal syncope in the setting of BM. Currently asymptomatic and normotensive in the ED after fluid resuscitation. Was likely exacerbated by dehydration and initiation of Imdur. Patient denied any chest pain/ SOB prior to event.   - ECG: NSR with non- specific changes   - Echocardiogram 6/23/22 EF 55-60%, trace AI and trival pericardial effusion   - Recommend adequate fluid resuscitation and orthostatic vital signs  - Avoid Imdur for now. Has a follow up appt with Dr. Smith on Thursday. Can discuss other anti anginal vs. cardiac rehab with outpatient Cardiologist  - No indication for cardiac telemetry as patient has normal workup and was discharged this afternoon  - Dispo per ED

## 2022-06-27 NOTE — ED PROVIDER NOTE - CLINICAL SUMMARY MEDICAL DECISION MAKING FREE TEXT BOX
syncope x 2 today after discharge from hospital. temporally associated with bm raising suspicion for vagal episode however happened x 2 and still hypotensive on arrival in ed. recent cad/stent concerning for cardiac etiology. labs/cxr/ekg done. trop downtrending. ct head ordered given headache and shows lacunar infarct age indeterminate, no bleed. given 500ml bolus, repeat bp still low. plan to discuss with cards, possible admit. cards fellow paged, reportedly doing procedure (swan cath). await callback. signed out to Dr. Gonzalez/ CLAUDIA Carlos pending discussion with jose, likely admit

## 2022-06-27 NOTE — ED ADULT NURSE NOTE - NSIMPLEMENTINTERV_GEN_ALL_ED
Implemented All Fall with Harm Risk Interventions:  [place] to call system. Call bell, personal items and telephone within reach. Instruct patient to call for assistance. Room bathroom lighting operational. Non-slip footwear when patient is off stretcher. Physically safe environment: no spills, clutter or unnecessary equipment. Stretcher in lowest position, wheels locked, appropriate side rails in place. Provide visual cue, wrist band, yellow gown, etc. Monitor gait and stability. Monitor for mental status changes and reorient to person, place, and time. Review medications for side effects contributing to fall risk. Reinforce activity limits and safety measures with patient and family. Provide visual clues: red socks.

## 2022-06-27 NOTE — DISCHARGE NOTE PROVIDER - NSDCMRMEDTOKEN_GEN_ALL_CORE_FT
aspirin 81 mg oral delayed release tablet: 1 tab(s) orally once a day  atorvastatin 40 mg oral tablet: 1 tab(s) orally once a day (at bedtime)  Cardiac rehabilitation. 3 times a week for 12 weeks. Dx CAD s/p PCI. Outpatient cardiologist Dr Feilz Smith:   clopidogrel 75 mg oral tablet: 1 tab(s) orally once a day   aspirin 81 mg oral delayed release tablet: 1 tab(s) orally once a day  atorvastatin 40 mg oral tablet: 1 tab(s) orally once a day (at bedtime)  Cardiac rehabilitation. 3 times a week for 12 weeks. Dx CAD s/p PCI. Outpatient cardiologist Dr Feliz Smith:   clopidogrel 75 mg oral tablet: 1 tab(s) orally once a day  isosorbide mononitrate 30 mg oral tablet, extended release: 1 tab(s) orally once a day   aspirin 81 mg oral delayed release tablet: 1 tab(s) orally once a day  atorvastatin 40 mg oral tablet: 1 tab(s) orally once a day (at bedtime)  Cardiac rehabilitation. 3 times a week for 12 weeks. Dx CAD s/p PCI. Outpatient cardiologist Dr Feliz Smith:   clopidogrel 75 mg oral tablet: 1 tab(s) orally once a day  isosorbide mononitrate 30 mg oral tablet, extended release: 1 tab(s) orally once a day  pantoprazole 40 mg oral delayed release tablet: 1 tab(s) orally once a day

## 2022-06-27 NOTE — H&P ADULT - NSHPREVIEWOFSYSTEMS_GEN_ALL_CORE
GENERAL, CONSTITUTIONAL : denies recent weight loss, fever, chills  EYES, VISION: denies changes in vision   EARS, NOSE, THROAT: denies hearing loss  HEART, CARDIOVASCULAR: admts to chest pain described as "burning" sensation, denies, palpitations  RESPIRATORY: Denies cough, admits to SOB associated with cp, denies wheezing, PND, orthopnea  GASTROINTESTINAL: Denies abdominal pain, heartburn, bloody stool, dark tarry stool  GENITOURINARY: Denies frequent urination, urgency  MUSCULOSKELETAL denies joint pain or swelling, restricted motion, musculoskeletal pain.   SKIN & INTEGUMENTARY Denies rashes, sores, blisters, blisters, growths.  NEUROLOGICAL: Denies numbness or tingling sensations, sensation loss, burning.   PSYCHIATRIC: Denies nervousness, anxiety, depression  ENDOCRINE Denies heat or cold intolerance, excessive thirst  HEMATOLOGIC/LYMPHATIC: Denies abnormal bleeding, bleeding of any kind

## 2022-06-27 NOTE — H&P ADULT - NSICDXPASTMEDICALHX_GEN_ALL_CORE_FT
PAST MEDICAL HISTORY:  CAD (coronary artery disease) s/p Cardiac Cath 6/23/22 JUNO mRCA    Hyperlipidemia

## 2022-06-27 NOTE — H&P ADULT - HISTORY OF PRESENT ILLNESS
A&O X3   Full Code    A&O X3 Full Code      73 y/o male with hx of CAD s/p Cardiac Catheterization on 6/23/22 @[facility] JUNO x 1 mRCA (85%); LM normal, mLAD mild diffuse, LCx normal, D1 mild dz, RPDA mild dz, EDP 8, access R TR, Echocardiogram 6/23/22 EF 55-60%, trace AI and trival pericardial effusion presents to [facility] ER this morning, 6/27/22, with two episodes of chest "burning" sensation associated with SOB.             A&O X3   Full Code    A&O X3 Full Code      73 y/o male with hx of CAD s/p Cardiac Catheterization on 6/23/22 @[facility] JUNO x 1 mRCA (85%); LM normal, mLAD mild diffuse, LCx normal, D1 mild dz, RPDA mild dz, EDP 8, access R TR, Echocardiogram 6/23/22 EF 55-60%, trace AI and trival pericardial effusion presents to [facility] ER this morning, 6/27/22, with two episodes of chest "burning" sensation associated with SOB and tingling sensation left hand.     In speaking with patient, he reports last night around 10PM while sitting down watching TV, he suddenly experienced diffuse midsternal "burning" sensation chest pain, rated 4/10, associated with SOB and left hand tingling.  Pt. states episode was short lived, lasting 3 minutes, resolving without taking medication, second episode occurred at 3AM with same intensity waking him up from sleep.  Pt. decided to come to ER for further evaluation.  He reports compliance with medication and is currently cp free.  He denies fever, chills, HA, cp, SOB, dizziness, diaphoresis, abdominal discomfort and n/v/d.    In ECG reveals NSR@66bpm with non specific ST-T wave changes (unchanged from prior ECG on 6/23/22), Troponin 0.05), H/H 12.4/36.2 and COVID-19 non detected.       In light of patient's symptoms and recent Cardiac Cath on 6/23/22 @[facility] JUNO mLAD pt. is now admitted to [facility] under cardiology for further evaluation.             A&O X3 Full Code      71 y/o male with hx of CAD s/p Cardiac Catheterization on 6/23/22 @[facility] JUNO x 1 mRCA (85%); LM normal, mLAD mild diffuse, LCx normal, D1 mild dz, RPDA mild dz, EDP 8, access R TR, Echocardiogram 6/23/22 EF 55-60%, trace AI and trival pericardial effusion presents to [facility] ER this morning, 6/27/22, with two episodes of chest "burning" sensation associated with SOB and tingling sensation left hand.     In speaking with patient, he reports last night around 10PM while sitting down watching TV, he suddenly experienced diffuse midsternal "burning" sensation chest pain, rated 4/10, associated with SOB and left hand tingling.  Pt. states episode was short lived, lasting 3 minutes, resolving without taking medication, second episode occurred at 3AM with same intensity waking him up from sleep.  Pt. decided to come to ER for further evaluation.  He reports compliance with medication and is currently cp free.  He denies fever, chills, HA, cp, SOB, dizziness, diaphoresis, abdominal discomfort and n/v/d.    In ECG reveals NSR@66bpm with non specific ST-T wave changes (unchanged from prior ECG on 6/23/22), Troponin 0.05), H/H 12.4/36.2 and COVID-19 non detected.       In light of patient's symptoms and recent Cardiac Cath on 6/23/22 @[facility] JUNO mLAD pt. is now admitted to [facility] under cardiology for further evaluation.

## 2022-06-27 NOTE — ED PROVIDER NOTE - PATIENT PORTAL LINK FT
You can access the FollowMyHealth Patient Portal offered by [facility] by registering at the following website: [email or web address]. By joining [employer]’s FollowMyHealth portal, you will also be able to view your health information using other applications (apps) compatible with our system.

## 2022-06-27 NOTE — ED PROVIDER NOTE - NSFOLLOWUPINSTRUCTIONS_ED_ALL_ED_FT
You were seen in the Emergency Department today after syncopal episodes.   Your work-up was reassuring to rule out an acute medical emergency.   You were seen by the Cardiology team in the ED who believes it is safe for you to be discharged and follow up as an outpatient at this time.     STOP INDUR.   Continue all medications as previously prescribed.   Drink plenty of fluids, get plenty of rest.     Follow up with your Primary Care and Cardiologists as previously scheduled.     Return to this emergency department if you pass out or lose consciousness again / repeatedly, feel palpitations / chest pain / shortness of breath, have nausea vomiting diarrhea, any signs of bleeding anywhere (spontaneous bleeding, bloody/black stools), or if you have any other concerns.

## 2022-06-27 NOTE — DISCHARGE NOTE PROVIDER - REASON FOR ADMISSION
2 episodes Intermittent, non radiating diffuse chest burning sensation associated with SOB this morning.  Pt. is s/p recent cardiac cath 6/23/22 JUNO mRCA

## 2022-06-27 NOTE — ED PROVIDER NOTE - OBJECTIVE STATEMENT
hx of CAD s/p Cardiac Catheterization on 6/23/22 @[facility] JUNO x 1 mRCA (85%); LM normal, mLAD mild diffuse, LCx normal, D1 mild dz, RPDA mild dz, EDP 8, access R TR, Echocardiogram 6/23/22 EF 55-60%, trace AI and trival pericardial effusion, readmitted earlier today with burning chest pain/sob/headache/tingling sensation left hand, discharged home around noon, returns with syncope. Reports he had a bagel, felt like he needed to have bm. Was sitting on toilet, had bm, then felt lightheaded/dizzy/sweaty. Got up and sat on couch. Felt he needed to have bm again so spouse helped him to toilet. Passed out once in there. She went to call 911, he had another larger loose bm. Then passed out again. Denies chest pain, abdominal pain, vomiting, fever. Currently with generalized weakness, mild headache.

## 2022-06-27 NOTE — PATIENT PROFILE ADULT - FALL HARM RISK - HARM RISK INTERVENTIONS
Bed in lowest position, wheels locked, appropriate side rails in place/Call bell, personal items and telephone in reach/Instruct patient to call for assistance before getting out of bed or chair/Non-slip footwear when patient is out of bed/[place] to call system/Physically safe environment - no spills, clutter or unnecessary equipment/Purposeful Proactive Rounding/Room/bathroom lighting operational, light cord in reach

## 2022-06-28 PROBLEM — I25.10 ATHEROSCLEROTIC HEART DISEASE OF NATIVE CORONARY ARTERY WITHOUT ANGINA PECTORIS: Chronic | Status: ACTIVE | Noted: 2022-06-27

## 2022-06-28 PROBLEM — E78.5 HYPERLIPIDEMIA, UNSPECIFIED: Chronic | Status: ACTIVE | Noted: 2022-06-27

## 2022-06-29 DIAGNOSIS — I25.110 ATHEROSCLEROTIC HEART DISEASE OF NATIVE CORONARY ARTERY WITH UNSTABLE ANGINA PECTORIS: ICD-10-CM

## 2022-06-29 DIAGNOSIS — E78.5 HYPERLIPIDEMIA, UNSPECIFIED: ICD-10-CM

## 2022-07-01 DIAGNOSIS — R07.9 CHEST PAIN, UNSPECIFIED: ICD-10-CM

## 2022-07-01 DIAGNOSIS — Z79.82 LONG TERM (CURRENT) USE OF ASPIRIN: ICD-10-CM

## 2022-07-01 DIAGNOSIS — Z95.5 PRESENCE OF CORONARY ANGIOPLASTY IMPLANT AND GRAFT: ICD-10-CM

## 2022-07-01 DIAGNOSIS — Z20.822 CONTACT WITH AND (SUSPECTED) EXPOSURE TO COVID-19: ICD-10-CM

## 2022-07-01 DIAGNOSIS — E78.5 HYPERLIPIDEMIA, UNSPECIFIED: ICD-10-CM

## 2022-07-01 DIAGNOSIS — R55 SYNCOPE AND COLLAPSE: ICD-10-CM

## 2022-07-01 DIAGNOSIS — I25.110 ATHEROSCLEROTIC HEART DISEASE OF NATIVE CORONARY ARTERY WITH UNSTABLE ANGINA PECTORIS: ICD-10-CM

## 2022-07-01 DIAGNOSIS — Z79.02 LONG TERM (CURRENT) USE OF ANTITHROMBOTICS/ANTIPLATELETS: ICD-10-CM

## 2022-07-01 DIAGNOSIS — I25.10 ATHEROSCLEROTIC HEART DISEASE OF NATIVE CORONARY ARTERY WITHOUT ANGINA PECTORIS: ICD-10-CM

## 2022-10-13 NOTE — ED PROVIDER NOTE - MDM ORDERS SUBMITTED SELECTION
Patient's s/o informed of need to cancel surgery until cleared by cardiology. She verbalized agreement & understanding.    Labs/EKG/Imaging Studies

## 2022-10-14 ENCOUNTER — OUTPATIENT (OUTPATIENT)
Dept: OUTPATIENT SERVICES | Facility: HOSPITAL | Age: 72
LOS: 1 days | End: 2022-10-14
Payer: COMMERCIAL

## 2022-10-14 DIAGNOSIS — I25.9 CHRONIC ISCHEMIC HEART DISEASE, UNSPECIFIED: ICD-10-CM

## 2022-10-14 DIAGNOSIS — Z98.890 OTHER SPECIFIED POSTPROCEDURAL STATES: Chronic | ICD-10-CM

## 2022-10-14 PROCEDURE — 93016 CV STRESS TEST SUPVJ ONLY: CPT

## 2022-10-14 PROCEDURE — 78452 HT MUSCLE IMAGE SPECT MULT: CPT

## 2022-10-14 PROCEDURE — A9500: CPT

## 2022-10-14 PROCEDURE — 93018 CV STRESS TEST I&R ONLY: CPT

## 2022-10-14 PROCEDURE — 93017 CV STRESS TEST TRACING ONLY: CPT

## 2022-10-14 PROCEDURE — 78452 HT MUSCLE IMAGE SPECT MULT: CPT | Mod: 26

## 2022-10-29 ENCOUNTER — NON-APPOINTMENT (OUTPATIENT)
Age: 72
End: 2022-10-29

## 2022-11-19 ENCOUNTER — NON-APPOINTMENT (OUTPATIENT)
Age: 72
End: 2022-11-19

## 2023-01-18 ENCOUNTER — INPATIENT (INPATIENT)
Facility: HOSPITAL | Age: 73
LOS: 1 days | Discharge: HOME | DRG: 287 | End: 2023-01-20
Attending: INTERNAL MEDICINE | Admitting: INTERNAL MEDICINE
Payer: COMMERCIAL

## 2023-01-18 VITALS
HEIGHT: 68 IN | RESPIRATION RATE: 18 BRPM | OXYGEN SATURATION: 95 % | WEIGHT: 199.96 LBS | HEART RATE: 76 BPM | SYSTOLIC BLOOD PRESSURE: 167 MMHG | DIASTOLIC BLOOD PRESSURE: 97 MMHG | TEMPERATURE: 99 F

## 2023-01-18 DIAGNOSIS — I20.0 UNSTABLE ANGINA: ICD-10-CM

## 2023-01-18 DIAGNOSIS — Z98.890 OTHER SPECIFIED POSTPROCEDURAL STATES: Chronic | ICD-10-CM

## 2023-01-18 DIAGNOSIS — E78.5 HYPERLIPIDEMIA, UNSPECIFIED: ICD-10-CM

## 2023-01-18 LAB
ALBUMIN SERPL ELPH-MCNC: 4.3 G/DL — SIGNIFICANT CHANGE UP (ref 3.3–5)
ALP SERPL-CCNC: 55 U/L — SIGNIFICANT CHANGE UP (ref 40–120)
ALT FLD-CCNC: 29 U/L — SIGNIFICANT CHANGE UP (ref 10–45)
ANION GAP SERPL CALC-SCNC: 10 MMOL/L — SIGNIFICANT CHANGE UP (ref 5–17)
APTT BLD: 38.6 SEC — HIGH (ref 27.5–35.5)
AST SERPL-CCNC: 21 U/L — SIGNIFICANT CHANGE UP (ref 10–40)
BASOPHILS # BLD AUTO: 0.03 K/UL — SIGNIFICANT CHANGE UP (ref 0–0.2)
BASOPHILS NFR BLD AUTO: 0.3 % — SIGNIFICANT CHANGE UP (ref 0–2)
BILIRUB SERPL-MCNC: 0.3 MG/DL — SIGNIFICANT CHANGE UP (ref 0.2–1.2)
BUN SERPL-MCNC: 33 MG/DL — HIGH (ref 7–23)
CALCIUM SERPL-MCNC: 9.5 MG/DL — SIGNIFICANT CHANGE UP (ref 8.4–10.5)
CHLORIDE SERPL-SCNC: 101 MMOL/L — SIGNIFICANT CHANGE UP (ref 96–108)
CK MB CFR SERPL CALC: 2.2 NG/ML — SIGNIFICANT CHANGE UP (ref 0–6.7)
CK MB CFR SERPL CALC: 2.3 NG/ML — SIGNIFICANT CHANGE UP (ref 0–6.7)
CK SERPL-CCNC: 103 U/L — SIGNIFICANT CHANGE UP (ref 30–200)
CK SERPL-CCNC: 116 U/L — SIGNIFICANT CHANGE UP (ref 30–200)
CO2 SERPL-SCNC: 26 MMOL/L — SIGNIFICANT CHANGE UP (ref 22–31)
CREAT SERPL-MCNC: 1.19 MG/DL — SIGNIFICANT CHANGE UP (ref 0.5–1.3)
EGFR: 65 ML/MIN/1.73M2 — SIGNIFICANT CHANGE UP
EOSINOPHIL # BLD AUTO: 0.46 K/UL — SIGNIFICANT CHANGE UP (ref 0–0.5)
EOSINOPHIL NFR BLD AUTO: 5 % — SIGNIFICANT CHANGE UP (ref 0–6)
GLUCOSE SERPL-MCNC: 106 MG/DL — HIGH (ref 70–99)
HCT VFR BLD CALC: 39.7 % — SIGNIFICANT CHANGE UP (ref 39–50)
HGB BLD-MCNC: 13.7 G/DL — SIGNIFICANT CHANGE UP (ref 13–17)
IMM GRANULOCYTES NFR BLD AUTO: 0.2 % — SIGNIFICANT CHANGE UP (ref 0–0.9)
INR BLD: 1.02 — SIGNIFICANT CHANGE UP (ref 0.88–1.16)
LYMPHOCYTES # BLD AUTO: 2.14 K/UL — SIGNIFICANT CHANGE UP (ref 1–3.3)
LYMPHOCYTES # BLD AUTO: 23.5 % — SIGNIFICANT CHANGE UP (ref 13–44)
MAGNESIUM SERPL-MCNC: 2.1 MG/DL — SIGNIFICANT CHANGE UP (ref 1.6–2.6)
MCHC RBC-ENTMCNC: 32.2 PG — SIGNIFICANT CHANGE UP (ref 27–34)
MCHC RBC-ENTMCNC: 34.5 GM/DL — SIGNIFICANT CHANGE UP (ref 32–36)
MCV RBC AUTO: 93.2 FL — SIGNIFICANT CHANGE UP (ref 80–100)
MONOCYTES # BLD AUTO: 0.95 K/UL — HIGH (ref 0–0.9)
MONOCYTES NFR BLD AUTO: 10.4 % — SIGNIFICANT CHANGE UP (ref 2–14)
NEUTROPHILS # BLD AUTO: 5.52 K/UL — SIGNIFICANT CHANGE UP (ref 1.8–7.4)
NEUTROPHILS NFR BLD AUTO: 60.6 % — SIGNIFICANT CHANGE UP (ref 43–77)
NRBC # BLD: 0 /100 WBCS — SIGNIFICANT CHANGE UP (ref 0–0)
NT-PROBNP SERPL-SCNC: 23 PG/ML — SIGNIFICANT CHANGE UP (ref 0–300)
PLATELET # BLD AUTO: 306 K/UL — SIGNIFICANT CHANGE UP (ref 150–400)
POTASSIUM SERPL-MCNC: 3.9 MMOL/L — SIGNIFICANT CHANGE UP (ref 3.5–5.3)
POTASSIUM SERPL-SCNC: 3.9 MMOL/L — SIGNIFICANT CHANGE UP (ref 3.5–5.3)
PROT SERPL-MCNC: 7.7 G/DL — SIGNIFICANT CHANGE UP (ref 6–8.3)
PROTHROM AB SERPL-ACNC: 12.2 SEC — SIGNIFICANT CHANGE UP (ref 10.5–13.4)
RAPID RVP RESULT: SIGNIFICANT CHANGE UP
RBC # BLD: 4.26 M/UL — SIGNIFICANT CHANGE UP (ref 4.2–5.8)
RBC # FLD: 14 % — SIGNIFICANT CHANGE UP (ref 10.3–14.5)
SARS-COV-2 RNA SPEC QL NAA+PROBE: NEGATIVE — SIGNIFICANT CHANGE UP
SARS-COV-2 RNA SPEC QL NAA+PROBE: SIGNIFICANT CHANGE UP
SODIUM SERPL-SCNC: 137 MMOL/L — SIGNIFICANT CHANGE UP (ref 135–145)
TROPONIN T SERPL-MCNC: 0.01 NG/ML — SIGNIFICANT CHANGE UP (ref 0–0.01)
TROPONIN T SERPL-MCNC: 0.01 NG/ML — SIGNIFICANT CHANGE UP (ref 0–0.01)
TROPONIN T SERPL-MCNC: <0.01 NG/ML — SIGNIFICANT CHANGE UP (ref 0–0.01)
WBC # BLD: 9.12 K/UL — SIGNIFICANT CHANGE UP (ref 3.8–10.5)
WBC # FLD AUTO: 9.12 K/UL — SIGNIFICANT CHANGE UP (ref 3.8–10.5)

## 2023-01-18 PROCEDURE — 74174 CTA ABD&PLVS W/CONTRAST: CPT | Mod: 26,MA

## 2023-01-18 PROCEDURE — 75574 CT ANGIO HRT W/3D IMAGE: CPT | Mod: 26,MA

## 2023-01-18 PROCEDURE — 99285 EMERGENCY DEPT VISIT HI MDM: CPT

## 2023-01-18 PROCEDURE — 71045 X-RAY EXAM CHEST 1 VIEW: CPT | Mod: 26

## 2023-01-18 PROCEDURE — 71275 CT ANGIOGRAPHY CHEST: CPT | Mod: 26,MA

## 2023-01-18 RX ORDER — ATORVASTATIN CALCIUM 80 MG/1
40 TABLET, FILM COATED ORAL AT BEDTIME
Refills: 0 | Status: DISCONTINUED | OUTPATIENT
Start: 2023-01-18 | End: 2023-01-20

## 2023-01-18 RX ORDER — SODIUM CHLORIDE 9 MG/ML
500 INJECTION INTRAMUSCULAR; INTRAVENOUS; SUBCUTANEOUS ONCE
Refills: 0 | Status: COMPLETED | OUTPATIENT
Start: 2023-01-18 | End: 2023-01-18

## 2023-01-18 RX ORDER — ACETAMINOPHEN 500 MG
1000 TABLET ORAL ONCE
Refills: 0 | Status: COMPLETED | OUTPATIENT
Start: 2023-01-18 | End: 2023-01-18

## 2023-01-18 RX ORDER — CLOPIDOGREL BISULFATE 75 MG/1
75 TABLET, FILM COATED ORAL DAILY
Refills: 0 | Status: DISCONTINUED | OUTPATIENT
Start: 2023-01-19 | End: 2023-01-20

## 2023-01-18 RX ORDER — ASPIRIN/CALCIUM CARB/MAGNESIUM 324 MG
325 TABLET ORAL ONCE
Refills: 0 | Status: COMPLETED | OUTPATIENT
Start: 2023-01-18 | End: 2023-01-18

## 2023-01-18 RX ORDER — ONDANSETRON 8 MG/1
4 TABLET, FILM COATED ORAL ONCE
Refills: 0 | Status: COMPLETED | OUTPATIENT
Start: 2023-01-18 | End: 2023-01-18

## 2023-01-18 RX ORDER — ASPIRIN/CALCIUM CARB/MAGNESIUM 324 MG
324 TABLET ORAL ONCE
Refills: 0 | Status: COMPLETED | OUTPATIENT
Start: 2023-01-18 | End: 2023-01-18

## 2023-01-18 RX ORDER — SODIUM CHLORIDE 9 MG/ML
500 INJECTION INTRAMUSCULAR; INTRAVENOUS; SUBCUTANEOUS
Refills: 0 | Status: DISCONTINUED | OUTPATIENT
Start: 2023-01-19 | End: 2023-01-20

## 2023-01-18 RX ORDER — MORPHINE SULFATE 50 MG/1
2 CAPSULE, EXTENDED RELEASE ORAL ONCE
Refills: 0 | Status: DISCONTINUED | OUTPATIENT
Start: 2023-01-18 | End: 2023-01-18

## 2023-01-18 RX ORDER — NITROGLYCERIN 6.5 MG
0.4 CAPSULE, EXTENDED RELEASE ORAL
Refills: 0 | Status: DISCONTINUED | OUTPATIENT
Start: 2023-01-18 | End: 2023-01-20

## 2023-01-18 RX ORDER — ASPIRIN/CALCIUM CARB/MAGNESIUM 324 MG
81 TABLET ORAL DAILY
Refills: 0 | Status: DISCONTINUED | OUTPATIENT
Start: 2023-01-19 | End: 2023-01-20

## 2023-01-18 RX ADMIN — Medication 400 MILLIGRAM(S): at 13:56

## 2023-01-18 RX ADMIN — Medication 1000 MILLIGRAM(S): at 21:26

## 2023-01-18 RX ADMIN — ONDANSETRON 4 MILLIGRAM(S): 8 TABLET, FILM COATED ORAL at 12:03

## 2023-01-18 RX ADMIN — ATORVASTATIN CALCIUM 40 MILLIGRAM(S): 80 TABLET, FILM COATED ORAL at 21:27

## 2023-01-18 RX ADMIN — MORPHINE SULFATE 2 MILLIGRAM(S): 50 CAPSULE, EXTENDED RELEASE ORAL at 11:28

## 2023-01-18 RX ADMIN — SODIUM CHLORIDE 500 MILLILITER(S): 9 INJECTION INTRAMUSCULAR; INTRAVENOUS; SUBCUTANEOUS at 13:55

## 2023-01-18 RX ADMIN — Medication 0.4 MILLIGRAM(S): at 10:18

## 2023-01-18 RX ADMIN — Medication 1000 MILLIGRAM(S): at 22:30

## 2023-01-18 RX ADMIN — Medication 324 MILLIGRAM(S): at 10:28

## 2023-01-18 NOTE — H&P ADULT - HISTORY OF PRESENT ILLNESS
Patient is a 71 y/o M, with PMHX of CAD (s/p recently underwent PCI at [facility] 6/23/22: s/p JUNO x 1 mRCA (85%); LM normal, mLAD mild diffuse, LCx normal, D1 mild dz, RPDA mild dz, EDP 8, access R TR w/ Dr. Marina), who presented to [facility] ED on 1/18/23 with complaints of pressure-like chest pain that started when he woke up this morning. Patient states his CP radiates across his upper back and is associated with SOB. Patient denies palpitations, dizziness, headache, fever, chills, n/v/d, fatigue, orthopnea, or other complaints.     In the ED, VS: T: 98.6 F, RR: 18 breaths/min, HR: 76 bpm spO2: 95% on RA  Labs significant for: Bun/Cr: 33/1.19, Troponin 0.01 x 2, RVP/COVID-19: Negative, CCTA 1/18/23: In-stent stenosis of the mid RCA stent.  Mild-moderate stenosis of the mid LAD. Mild stenoses of the proximal LAD, proximal to mid LCX, proximal RCA and RPL. The remaining segments are normal.  CTA Chest: Negative for PE, Aortic Aneurysm or Dissection.   Patient received: Aspirin 325 mg PO x 1, nitroglycerin 0.4 mg SL x 1 (with drop of BP), Zofran 4 mg IVP x 1, Tylenol 1g IVP x 1, and NS 0.9% 1L IVB x 1  Patient admitted to cardiology/telemetry for unstable angina.  Patient is a 73 y/o M, with PMHX of CAD (s/p recently underwent PCI at [facility] 6/23/22: s/p JUNO x 1 mRCA (85%); LM normal, mLAD mild diffuse, LCx normal, D1 mild dz, RPDA mild dz, EDP 8, access R TR w/ Dr. Marina), who presented to [facility] ED on 1/18/23 with complaints of pressure-like chest pain that started when he woke up this morning. Patient states his CP radiates across his upper back and is associated with SOB. He states his CP has intensified throughout the day.  Patient denies palpitations, dizziness, headache, fever, chills, n/v/d, fatigue, orthopnea, or other complaints.     In the ED, VS: T: 98.6 F, RR: 18 breaths/min, HR: 76 bpm spO2: 95% on RA  Labs significant for: Bun/Cr: 33/1.19, Troponin 0.01 x 2, RVP/COVID-19: Negative, CCTA 1/18/23: In-stent stenosis of the mid RCA stent.  Mild-moderate stenosis of the mid LAD. Mild stenoses of the proximal LAD, proximal to mid LCX, proximal RCA and RPL. The remaining segments are normal.  CTA Chest: Negative for PE, Aortic Aneurysm or Dissection.   Patient received: Aspirin 325 mg PO x 1, nitroglycerin 0.4 mg SL x 1 (with drop of BP), Zofran 4 mg IVP x 1, Tylenol 1g IVP x 1, and NS 0.9% 1L IVB x 1  Patient admitted to cardiology/telemetry for unstable angina.

## 2023-01-18 NOTE — H&P ADULT - PROBLEM SELECTOR PLAN 2
- Fasting Lipid Panel ordered, f/u results  - Continue Atorvastatin 40 mg daily    F: pre-cath fluids  E: K >4, Mg > 2  N: DASH/TLC, NPO after midnight   Dvt: awaiting cath   Dispo: pending cardiac cath

## 2023-01-18 NOTE — ED ADULT NURSE NOTE - BEFAST FACE
Orthostatics completed on pt.   Layin/78   Sittin/74   Standin/57 , pt c/o nausea and slight dizziness.
No

## 2023-01-18 NOTE — ED PROVIDER NOTE - PHYSICAL EXAMINATION
VITAL SIGNS: I have reviewed nursing notes and confirm.  CONSTITUTIONAL: Pale appearing; is somewhat uncomfortable appearing.  SKIN: Agree with RN documentation regarding decubitus evaluation. Remainder of skin exam is warm and dry, no acute rash.  HEAD: Normocephalic; atraumatic.  EYES: PERRL, EOM intact; conjunctiva and sclera clear.  ENT: No nasal discharge; airway clear.  NECK: Supple; non tender.  CARD: S1, S2 normal; no murmurs, gallops, or rubs. Regular rate and rhythm.  RESP: Mild diffuse crackles. No wheezes or rhonchi.  ABD: Normal bowel sounds; soft; non-distended; non-tender; no hepatosplenomegaly.  EXT: Normal ROM. No clubbing or cyanosis. Trace pitting edema b/l. No calf tenderness.    LYMPH: No acute cervical adenopathy.  NEURO: Alert, oriented. Grossly unremarkable.  PSYCH: Cooperative, appropriate.

## 2023-01-18 NOTE — H&P ADULT - ASSESSMENT
Patient is a 73 y/o M, with PMHX of CAD (s/p recently underwent PCI at [facility] 6/23/22: s/p JUNO x 1 mRCA (85%); LM normal, mLAD mild diffuse, LCx normal, D1 mild dz, RPDA mild dz, EDP 8, access R TR w/ Dr. Marina), who presented to [facility] ED on 1/18/23 with complaints of pressure-like chest pain that started when he woke up this morning. Patient states his CP radiates across his upper back and is associated with SOB. Patient denies palpitations, dizziness, headache, fever, chills, n/v/d, fatigue, orthopnea, or other complaints.     In the ED, VS: T: 98.6 F, RR: 18 breaths/min, HR: 76 bpm spO2: 95% on RA  Labs significant for: Bun/Cr: 33/1.19, Troponin 0.01 x 2, RVP/COVID-19: Negative, CCTA 1/18/23: In-stent stenosis of the mid RCA stent.  Mild-moderate stenosis of the mid LAD. Mild stenoses of the proximal LAD, proximal to mid LCX, proximal RCA and RPL. The remaining segments are normal.  CTA Chest: Negative for PE, Aortic Aneurysm or Dissection. EKG:   Patient received: Aspirin 325 mg PO x 1, nitroglycerin 0.4 mg SL x 1 (with drop of BP), Zofran 4 mg IVP x 1, Tylenol 1g IVP x 1, and NS 0.9% 1L IVB x 1  Patient admitted to cardiology/telemetry for unstable angina.    Patient is a 73 y/o M, with PMHX of CAD (s/p recently underwent PCI at [facility] 6/23/22: s/p JUNO x 1 mRCA (85%); LM normal, mLAD mild diffuse, LCx normal, D1 mild dz, RPDA mild dz, EDP 8, access R TR w/ Dr. Marina), who presented to [facility] ED on 1/18/23 with complaints of pressure-like chest pain that started when he woke up this morning. Patient states his CP radiates across his upper back and is associated with SOB. He states his CP has intensified throughout the day. Patient troponin negative x 2, underwent CCTA 1/18/23: In-stent stenosis of the mid RCA stent.  Mild-moderate stenosis of the mid LAD. Mild stenoses of the proximal LAD, proximal to mid LCX, proximal RCA and RPL. Patient pre-cathed/consented, added to the schedule, and will undergo cardiac cath with Dr. Marina on 1/19/23.       Risks & benefits of procedure and alternative therapy have been explained to the patient including but not limited to: allergic reaction, bleeding w/possible need for blood transfusion, infection, renal and vascular compromise, limb damage, arrhythmia, stroke, vessel dissection/perforation, Myocardial infarction, emergent CABG. Informed consent obtained and in chart.

## 2023-01-18 NOTE — H&P ADULT - PROBLEM SELECTOR PLAN 1
- On admit: Patient endorsing pressure-like CP upon wakening   - s/p PCI at [facility] 6/23/22: s/p JUNO x 1 mRCA (85%); LM normal, mLAD mild diffuse, LCx normal, D1 mild dz, RPDA mild dz, EDP 8, access R TR w/ Dr. Marina  - CCTA 1/18/23: In-stent stenosis of the mid RCA stent.  Mild-moderate stenosis of the mid LAD. Mild stenoses of the proximal LAD, proximal to mid LCX, proximal RCA and RPL. The remaining segments are normal.  CTA Chest: Negative for PE, Aortic Aneurysm or Dissection.   - Troponin 0.01 x 2, trend at 8    - EKG: NSR @ 71 bpm, no acute ST-T wave changes   - ECHO 06/23/22:  Normal left ventricular size and systolic function.  Probably normal right ventricular systolic function.  Trace aortic regurgitation.   No other significant valvular disease. Trivial pericardial effusion.  No prior echo is available for comparison.  - PLAN for ischemic eval - cath in AM   - ASA: III, Mallampati: III  - Pre-cath/consented/ email sent to scheduling   - Start NS 0.9 75 cc/hr x 12 hours at 6 am in morning   - F/u lipid and Hgb a1c  - TELE MONITORING

## 2023-01-18 NOTE — ED ADULT NURSE NOTE - OBJECTIVE STATEMENT
Pt is 72M hx stent placement June 2022 presents to ED c/o chest pressure and mid back pressure since this AM. Pt states "It started when I woke up and it hasn't dissipated its getting worse." Radial pulses +2 equal bilaterally. Pt alert, awake, resps unlabored on rm air, skin warm and dry. Continuous cardiac/pulse oximetry monitoring initiated. Takes daily aspirin, plavix, statin.

## 2023-01-18 NOTE — ED PROVIDER NOTE - OBJECTIVE STATEMENT
71 y/o M with PMHx CAD s/p stent placement in 6/2022 and HLD presents to ED c/o chest pain that started when he woke this morning. Pain is described as a pressure-like sensation across his chest was radiation across upper back. Pain is not exertional or colicky but is worsening with associated SOB. Pt is unsure if LE edema is new. Denies fevers, cough, leg pain, black/bloody stools, N/V/D, or sick contacts. [Least Pain Intensity: 0/10] : 0/10 [Maximal Pain Intensity: 8/10] : 8/10 [Pain Location: ___] : Pain Location: [unfilled] [OTC] : OTC [70: Cares for self; unalbe to carry on normal activity or do active work.] : 70: Cares for self; unable to carry on normal activity or do active work. [ECOG Performance Status: 2 - Ambulatory and capable of all self care but unable to carry out any work activities] : Performance Status: 2 - Ambulatory and capable of all self care but unable to carry out any work activities. Up and about more than 50% of waking hours

## 2023-01-18 NOTE — ED ADULT NURSE NOTE - TEMPLATE LIST FOR HEAD TO TOE ASSESSMENT
VIEW ALL Double Island Pedicle Flap Text: The defect edges were debeveled with a #15 scalpel blade.  Given the location of the defect, shape of the defect and the proximity to free margins a double island pedicle advancement flap was deemed most appropriate.  Using a sterile surgical marker, an appropriate advancement flap was drawn incorporating the defect, outlining the appropriate donor tissue and placing the expected incisions within the relaxed skin tension lines where possible.    The area thus outlined was incised deep to adipose tissue with a #15 scalpel blade.  The skin margins were undermined to an appropriate distance in all directions around the primary defect and laterally outward around the island pedicle utilizing iris scissors.  There was minimal undermining beneath the pedicle flap.

## 2023-01-18 NOTE — ED ADULT TRIAGE NOTE - CHIEF COMPLAINT QUOTE
Patient c/o chest pain radiating to the back and trouble breathing, beginning this morning.  Hx 1 cardiac stent placed ~ 6 months ago.

## 2023-01-18 NOTE — ED PROVIDER NOTE - CLINICAL SUMMARY MEDICAL DECISION MAKING FREE TEXT BOX
71 y/o M with known CAD presents to ED with chest pressure. EKG with no change. Consider ACS. Trop pending. Pain is not colicky and no h/o HTN or dissection. CXR with no apparent pneumonia, normal mediastinum, and possibly minimal congestion. Pt did not tolerate nitro. BP dropped into 90s with 1 nitro with no change in pain. Pt given aspirin. Plan for labs, CXR, and admit to cardiology for further assessment. Dispo pending w/u and reevaluation.

## 2023-01-18 NOTE — ED PROVIDER NOTE - WR ORDER DATE AND TIME 1
What Type Of Note Output Would You Prefer (Optional)?: Standard Output Hpi Title: Evaluation of Skin Lesions How Severe Are Your Spot(S)?: mild Have Your Spot(S) Been Treated In The Past?: has not been treated Family Member: Aunt 18-Jan-2023 10:13

## 2023-01-19 ENCOUNTER — TRANSCRIPTION ENCOUNTER (OUTPATIENT)
Age: 73
End: 2023-01-19

## 2023-01-19 LAB
ANION GAP SERPL CALC-SCNC: 6 MMOL/L — SIGNIFICANT CHANGE UP (ref 5–17)
APPEARANCE UR: CLEAR — SIGNIFICANT CHANGE UP
APTT BLD: 37.7 SEC — HIGH (ref 27.5–35.5)
BILIRUB UR-MCNC: NEGATIVE — SIGNIFICANT CHANGE UP
BUN SERPL-MCNC: 27 MG/DL — HIGH (ref 7–23)
CALCIUM SERPL-MCNC: 9.1 MG/DL — SIGNIFICANT CHANGE UP (ref 8.4–10.5)
CHLORIDE SERPL-SCNC: 103 MMOL/L — SIGNIFICANT CHANGE UP (ref 96–108)
CO2 SERPL-SCNC: 29 MMOL/L — SIGNIFICANT CHANGE UP (ref 22–31)
COLOR SPEC: YELLOW — SIGNIFICANT CHANGE UP
CREAT SERPL-MCNC: 0.89 MG/DL — SIGNIFICANT CHANGE UP (ref 0.5–1.3)
DIFF PNL FLD: NEGATIVE — SIGNIFICANT CHANGE UP
EGFR: 91 ML/MIN/1.73M2 — SIGNIFICANT CHANGE UP
GLUCOSE SERPL-MCNC: 106 MG/DL — HIGH (ref 70–99)
GLUCOSE UR QL: NEGATIVE — SIGNIFICANT CHANGE UP
HCT VFR BLD CALC: 36.9 % — LOW (ref 39–50)
HGB BLD-MCNC: 12.6 G/DL — LOW (ref 13–17)
INR BLD: 1.51 — HIGH (ref 0.88–1.16)
KETONES UR-MCNC: NEGATIVE — SIGNIFICANT CHANGE UP
LEUKOCYTE ESTERASE UR-ACNC: NEGATIVE — SIGNIFICANT CHANGE UP
MAGNESIUM SERPL-MCNC: 1.9 MG/DL — SIGNIFICANT CHANGE UP (ref 1.6–2.6)
MCHC RBC-ENTMCNC: 32.1 PG — SIGNIFICANT CHANGE UP (ref 27–34)
MCHC RBC-ENTMCNC: 34.1 GM/DL — SIGNIFICANT CHANGE UP (ref 32–36)
MCV RBC AUTO: 93.9 FL — SIGNIFICANT CHANGE UP (ref 80–100)
NITRITE UR-MCNC: NEGATIVE — SIGNIFICANT CHANGE UP
NRBC # BLD: 0 /100 WBCS — SIGNIFICANT CHANGE UP (ref 0–0)
PH UR: 5.5 — SIGNIFICANT CHANGE UP (ref 5–8)
PLATELET # BLD AUTO: 243 K/UL — SIGNIFICANT CHANGE UP (ref 150–400)
POTASSIUM SERPL-MCNC: 4.2 MMOL/L — SIGNIFICANT CHANGE UP (ref 3.5–5.3)
POTASSIUM SERPL-SCNC: 4.2 MMOL/L — SIGNIFICANT CHANGE UP (ref 3.5–5.3)
PROT UR-MCNC: NEGATIVE MG/DL — SIGNIFICANT CHANGE UP
PROTHROM AB SERPL-ACNC: 18.1 SEC — HIGH (ref 10.5–13.4)
RBC # BLD: 3.93 M/UL — LOW (ref 4.2–5.8)
RBC # FLD: 14.2 % — SIGNIFICANT CHANGE UP (ref 10.3–14.5)
SODIUM SERPL-SCNC: 138 MMOL/L — SIGNIFICANT CHANGE UP (ref 135–145)
SP GR SPEC: 1.02 — SIGNIFICANT CHANGE UP (ref 1–1.03)
UROBILINOGEN FLD QL: 2 E.U./DL
WBC # BLD: 16.19 K/UL — HIGH (ref 3.8–10.5)
WBC # FLD AUTO: 16.19 K/UL — HIGH (ref 3.8–10.5)

## 2023-01-19 PROCEDURE — 93010 ELECTROCARDIOGRAM REPORT: CPT

## 2023-01-19 PROCEDURE — 99152 MOD SED SAME PHYS/QHP 5/>YRS: CPT

## 2023-01-19 PROCEDURE — 99222 1ST HOSP IP/OBS MODERATE 55: CPT

## 2023-01-19 PROCEDURE — 93306 TTE W/DOPPLER COMPLETE: CPT | Mod: 26

## 2023-01-19 PROCEDURE — 93458 L HRT ARTERY/VENTRICLE ANGIO: CPT | Mod: 26

## 2023-01-19 RX ORDER — SODIUM CHLORIDE 9 MG/ML
1000 INJECTION INTRAMUSCULAR; INTRAVENOUS; SUBCUTANEOUS
Refills: 0 | Status: DISCONTINUED | OUTPATIENT
Start: 2023-01-19 | End: 2023-01-20

## 2023-01-19 RX ADMIN — CLOPIDOGREL BISULFATE 75 MILLIGRAM(S): 75 TABLET, FILM COATED ORAL at 05:20

## 2023-01-19 RX ADMIN — SODIUM CHLORIDE 75 MILLILITER(S): 9 INJECTION INTRAMUSCULAR; INTRAVENOUS; SUBCUTANEOUS at 06:25

## 2023-01-19 RX ADMIN — ONDANSETRON 4 MILLIGRAM(S): 8 TABLET, FILM COATED ORAL at 00:04

## 2023-01-19 RX ADMIN — Medication 81 MILLIGRAM(S): at 05:20

## 2023-01-19 RX ADMIN — ATORVASTATIN CALCIUM 40 MILLIGRAM(S): 80 TABLET, FILM COATED ORAL at 21:35

## 2023-01-19 RX ADMIN — SODIUM CHLORIDE 200 MILLILITER(S): 9 INJECTION INTRAMUSCULAR; INTRAVENOUS; SUBCUTANEOUS at 21:35

## 2023-01-19 NOTE — PROGRESS NOTE ADULT - PROBLEM SELECTOR PLAN 1
currently CP free  - EKG NSR TWI lead III, AVF  - Trop T negative x2  - s/p CTA Cors/Chest/Abd/Pelvis: Ca score is 378, mod-severe mLAD and mRCA, no aortic dissection, no PE.  - s/p cardiac cath 6/23/22 JUNO mRCA (85%); LM normal, mLAD mild diffuse, LCx normal, D1 mild dz, RPDA mild dz, EDP 8, access R TR  - pending TTE   - c/w ASA 81mg qd, Plavix 75mg qd, Lipitor 40mg qd - On admit: Patient endorsing pressure-like CP upon wakening   - s/p PCI at [facility] 6/23/22: s/p JUNO x 1 mRCA (85%); LM normal, mLAD mild diffuse, LCx normal, D1 mild dz, RPDA mild dz, EDP 8, access R TR w/ Dr. Marina  - CCTA 1/18/23: In-stent stenosis of the mid RCA stent.  Mild-moderate stenosis of the mid LAD. Mild stenoses of the proximal LAD, proximal to mid LCX, proximal RCA and RPL. The remaining segments are normal.  CTA Chest: Negative for PE, Aortic Aneurysm or Dissection.   - Troponin 0.01 x 2, trend at 8    - EKG: NSR @ 71 bpm, no acute ST-T wave changes   - ECHO 06/23/22:  Normal left ventricular size and systolic function.  Probably normal right ventricular systolic function.  Trace aortic regurgitation.   No other significant valvular disease. Trivial pericardial effusion.  No prior echo is available for comparison.  - PLAN for ischemic eval - cath in AM   - ASA: III, Mallampati: III  - Pre-cath/consented/ email sent to scheduling   - Start NS 0.9 75 cc/hr x 12 hours at 6 am in morning   - F/u lipid and Hgb a1c  - TELE MONITORING. - On admit: Patient endorsing pressure-like CP upon wakening   - s/p PCI at [facility] 6/23/22: s/p JUNO x 1 mRCA (85%); LM normal, mLAD mild diffuse, LCx normal, D1 mild dz, RPDA mild dz, EDP 8, access R TR w/ Dr. Brantley  - CCTA 1/18/23: In-stent stenosis of the mid RCA stent.  Mild-moderate stenosis of the mid LAD. Mild stenoses of the proximal LAD, proximal to mid LCX, proximal RCA and RPL. The remaining segments are normal.  CTA Chest: Negative for PE, Aortic Aneurysm or Dissection.   - Troponin 0.01 x 3   - EKG: NSR @ 71 bpm, no acute ST-T wave changes   - ECHO 06/23/22:  Normal left ventricular size and systolic function. trace AR  - NPO for cath with Dr. Brantley 1/19.    - c/w home ASA 81 mg daily, Plavix 75 m daily, Lipitor 40 mg daily.  - F/u lipid and Hgb a1c - On admit: Patient endorsing pressure-like CP upon wakening   - Troponin 0.01 x 3   - EKG: NSR @ 71 bpm, no acute ST-T wave changes   - s/p PCI at [facility] 6/23/22: s/p JUNO x 1 mRCA (85%); LM normal, mLAD mild diffuse, LCx normal, D1 mild dz, RPDA mild dz, EDP 8, access R TR w/ Dr. Brantley  - CCTA 1/18/23: In-stent stenosis of the mid RCA stent.  Mild-moderate stenosis of the mid LAD. Mild stenoses of the proximal LAD, proximal to mid LCX, proximal RCA and RPL. The remaining segments are normal.  CTA Chest: Negative for PE, Aortic Aneurysm or Dissection.   - ECHO 06/23/22:  Normal left ventricular size and systolic function. trace AR- ECHO 1/19/23: EF 65-70%, normal biventricular size and systolic function, Mild symmetric LVH, No significant valvular disease, PASP 27 mmHg, no pericardial effusion.  - NPO for cath with Dr. Brantley 1/19.    - c/w home ASA 81 mg daily, Plavix 75 m daily, Lipitor 40 mg daily.  - F/u lipid and Hgb a1c - On admit: Patient endorsing pressure-like CP upon wakening   - Troponin 0.01 x 3   - EKG: NSR @ 71 bpm, no acute ST-T wave changes   - s/p PCI at [facility] 6/23/22: s/p JUNO x 1 mRCA (85%); LM normal, mLAD mild diffuse, LCx normal, D1 mild dz, RPDA mild dz, EDP 8, access R TR w/ Dr. Brantley  - CCTA 1/18/23: In-stent stenosis of the mid RCA stent.  Mild-moderate stenosis of the mid LAD. Mild stenoses of the proximal LAD, proximal to mid LCX, proximal RCA and RPL. The remaining segments are normal.  CTA Chest: Negative for PE, Aortic Aneurysm or Dissection.   - ECHO 1/19/23: EF 65-70%, normal biventricular size and systolic function, Mild symmetric LVH, No significant valvular disease, PASP 27 mmHg, no pericardial effusion.  - NPO for cath with Dr. Brantley 1/19.    - c/w home ASA 81 mg daily, Plavix 75 m daily, Lipitor 40 mg daily.  - F/u lipid and Hgb a1c

## 2023-01-19 NOTE — ED ADULT NURSE REASSESSMENT NOTE - NS ED NURSE REASSESS COMMENT FT1
Pt and spouse updated on plan of care. Continuous cardiac/pulse oximetry monitoring intact. Pt experiencing epigastric pressure, MD peres made aware.
Pt brought to CT scan w SSA via stretcher
Pt c/o continued chest discomfort and nausea, pt states that chest discomfort is a 3/10, much improved from arrival, states that IV tylenol helped with pain, requesting more. Cardiac PA made aware, awaiting orders.
Pt refusing nitro sublingual, Cardiac team aware.
Pt scheduled for CT Angio on 11th floor; spoke w ROCIO Hawk to send for pt.
Pt vomiting 15 min s/p zofran IV. pt still c/o CP. Pt states "could this be acid I took pepsid after I went to dinner last night."  Pt wants to walk to BR; orthostatic VS completed.  MD Rodriguez made aware of assessment changes; will order CTA.
Continuous cardiac/pulse oximetry monitoring intact. Pending lab results.
MD Rodriguez aware of BP, advised to give morphine dose despite VS. Pt medicated as ordered. On ccm and pulse oximetry.
pt c/o lightheadedness 5min s/p SL NTG. Continuous cardiac/pulse oximetry monitoring intact. Dr Rodriguez brought to bedside. rpt EKG in progress.
Pt states feeling better. Pt pending admission. Continuous cardiac/pulse oximetry monitoring intact.
Pt and spouse updated on plan of care with understanding verbalized. Pt pending transport to CT Angio. Dispo pending results from CT angio.  Pt denies CP, back pain at this time; states still has some epigastric discomfort. Pt in NAD, VSS at time of this RN assessment.

## 2023-01-19 NOTE — PATIENT PROFILE ADULT - FALL HARM RISK - HARM RISK INTERVENTIONS

## 2023-01-19 NOTE — DISCHARGE NOTE PROVIDER - HOSPITAL COURSE
73 y/o M, with PMHX of CAD (s/p recently underwent PCI at [facility] 6/23/22: s/p JUNO x 1 mRCA (85%); LM normal, mLAD mild diffuse, LCx normal, D1 mild dz, RPDA mild dz, EDP 8, access R TR w/ Dr. Marina), who presented to [facility] ED on 1/18/23 with complaints of pressure-like chest pain that started when he woke up this morning. Patient states his CP radiates across his upper back and is associated with SOB. He states his CP has intensified throughout the day.  Patient denies palpitations, dizziness, headache, fever, chills, n/v/d, fatigue, orthopnea, or other complaints.  In the ED, VS: T: 98.6 F, RR: 18 breaths/min, HR: 76 bpm spO2: 95% on RA. Labs significant for: Bun/Cr: 33/1.19, Troponin 0.01 x 3 , RVP/COVID-19: Negative, CCTA 1/18/23: In-stent stenosis of the mid RCA stent.  Mild-moderate stenosis of the mid LAD. Mild stenoses of the proximal LAD, proximal to mid LCX, proximal RCA and RPL. The remaining segments are normal.  CTA Chest: Negative for PE, Aortic Aneurysm or Dissection.  Patient received: Aspirin 325 mg PO x 1, nitroglycerin 0.4 mg SL x 1 (with drop of BP), Zofran 4 mg IVP x 1, Tylenol 1g IVP x 1, and NS 0.9% 1L IVB x 1. Patient admitted to cardiology/telemetry for unstable angina.     ECHO 1/19:    Pt s/p cardiac cath 1/19:    On day of d/c, Pt. seen and examined at bedside. Pt. comfortable, denies any CP, SOB, dizziness, palpitations, ___  access site stable, no bleeding and hematoma noted, ___  pulse 2 + b/l.  VSS. Labs stable o/n. home meds reviewed with Dr. Claros and pt. to be d/c on __   	  Pt. stable to be d/c as per Dr. Claros and to f/u with Dr. Smith in 1-2 week. Patient has been given appropriate discharge instructions including medication regimen, access site management and follow up. Prescriptions have been e-prescribed to patient's preferred pharmacy       71 y/o M, with PMHX of CAD (s/p recently underwent PCI at [facility] 6/23/22: s/p JUNO x 1 mRCA (85%); LM normal, mLAD mild diffuse, LCx normal, D1 mild dz, RPDA mild dz, EDP 8, access R TR w/ Dr. Marina), who presented to [facility] ED on 1/18/23 with complaints of pressure-like chest pain that started when he woke up this morning. Patient states his CP radiates across his upper back and is associated with SOB. He states his CP has intensified throughout the day.  Patient denies palpitations, dizziness, headache, fever, chills, n/v/d, fatigue, orthopnea, or other complaints.  In the ED, VS: T: 98.6 F, RR: 18 breaths/min, HR: 76 bpm spO2: 95% on RA. Labs significant for: Bun/Cr: 33/1.19, Troponin 0.01 x 3 , RVP/COVID-19: Negative, CCTA 1/18/23: In-stent stenosis of the mid RCA stent.  Mild-moderate stenosis of the mid LAD. Mild stenoses of the proximal LAD, proximal to mid LCX, proximal RCA and RPL. The remaining segments are normal.  CTA Chest: Negative for PE, Aortic Aneurysm or Dissection.  Patient received: Aspirin 325 mg PO x 1, nitroglycerin 0.4 mg SL x 1 (with drop of BP), Zofran 4 mg IVP x 1, Tylenol 1g IVP x 1, and NS 0.9% 1L IVB x 1. Patient admitted to cardiology/telemetry for unstable angina.     ECHO 1/19: EF 65-70%, normal biventricular size and systolic function, Mild symmetric LVH, No significant valvular disease, PASP 27 mmHg, no pericardial effusion.    Pt s/p cardiac cath 1/19: EDP 6, pLAD 30%, pLCX 30%, RCA patent stent, RPDA minimal.      Patient seen and examined this AM and has no complaints. VSS. Patient voided and ambulated without issue. Telemetry reviewed which was unremarkable. Labs significant for Leukocytosis WBC 17.45 with left shift Neutrophils 87.8%. No Leukocytosis on admission. Patient afebrile. He reported abdominal pain and nausea treated with Maalox, Zofran and Reglan, Pepcid with improvement. This AM he reported nausea resolved but abdominal pain still present. He reports history of GERD and no BM since 1/17/23. Patient treated with Simethicone and Protonix as well as Dulcolax suppository (requested by patient and wife) with improvement in symptoms. Repeat LFTs normal and Repeat Covid and PCR negative. Patient C/P free and HD stable and cleared for discharge by Dr. Claros.   Home meds reviewed with Dr. Claros and pt. to be d/c on Aspirin 81 mg PO Daily, Plavix 75 mg PO Daily, Atorvastatin 40 mg PO Daily.   	  Pt. stable to be d/c as per Dr. Claros and to f/u with Dr. Smith in 1-2 week. Patient has been given appropriate discharge instructions including medication regimen, access site management and follow up. Prescriptions have been e-prescribed to patient's preferred pharmacy

## 2023-01-19 NOTE — PROGRESS NOTE ADULT - PROBLEM SELECTOR PLAN 2
- Fasting Lipid Panel ordered, f/u results  - Continue Atorvastatin 40 mg daily    F: pre-cath fluids  E: K >4, Mg > 2  N: DASH/TLC, NPO after midnight   Dvt: awaiting cath   Dispo: pending cardiac cath - Fasting Lipid Panel ordered, f/u results  - Continue Atorvastatin 40 mg daily    F: pre-cath fluids  E: K >4, Mg > 2  N: DASH/TLC, NPO after midnight   Dvt: awaiting cath   Dispo: pending cardiac cath. - Fasting Lipid Panel ordered, f/u results  - Continue Atorvastatin 40 mg daily    F: pre-cath fluids  E: K >4, Mg > 2  N: DASH/TLC, NPO for cath  Dvt: awaiting cath   Dispo: pending cardiac cath. - Fasting Lipid Panel ordered, f/u results  - Continue Atorvastatin 40 mg daily    F: pre-cath fluids  E: K >4, Mg > 2  N: DASH/TLC, NPO for cath  Dvt: awaiting cath   Dispo: pending cardiac cath.  Case d/w Dr. Claros

## 2023-01-19 NOTE — DISCHARGE NOTE PROVIDER - NSDCMRMEDTOKEN_GEN_ALL_CORE_FT
aspirin 81 mg oral delayed release tablet: 1 tab(s) orally once a day  atorvastatin 40 mg oral tablet: 1 tab(s) orally once a day (at bedtime)  clopidogrel 75 mg oral tablet: 1 tab(s) orally once a day   aspirin 81 mg oral delayed release tablet: 1 tab(s) orally once a day  atorvastatin 40 mg oral tablet: 1 tab(s) orally once a day (at bedtime)  clopidogrel 75 mg oral tablet: 1 tab(s) orally once a day  Protonix 40 mg oral granule, delayed release: 1 each orally once a day

## 2023-01-19 NOTE — DISCHARGE NOTE PROVIDER - NSDCFUADDINST_GEN_ALL_CORE_FT
•	Your procedure was done through your groin or your wrist  •	You do not need to keep this area covered and you may shower  •	Please avoid any heavy lifting  (no more than 3 to 5 lbs) or strenuous activity for five days  •	If you develop any swelling, bleeding, hardening of the skin (hematoma formation), acute pain, numbness/tingling  in your arm or leg please contact your doctor immediately or call our 24/7 line: [phone]   •	Your procedure was done through your right wrist  •	You do not need to keep this area covered and you may shower  •	Please avoid any heavy lifting  (no more than 3 to 5 lbs) or strenuous activity for five days  •	If you develop any swelling, bleeding, hardening of the skin (hematoma formation), acute pain, numbness/tingling in your right arm please contact your doctor immediately or call our 24/7 line: [phone]    A Mediterranean Diet is recommended! Some suggestions include continue incorporating 2 or more servings per day of vegetables, fruits, and whole grains. Increase intake of fish and legumes/beans to 2 or more servings per week. Aim to increase intake of healthy fats, such as olive oil and avocados, and have a handful of nuts/seeds most days. Reduce red/processed meat consumption to 2 or fewer times per week.

## 2023-01-19 NOTE — DISCHARGE NOTE PROVIDER - NSDCCPCAREPLAN_GEN_ALL_CORE_FT
PRINCIPAL DISCHARGE DIAGNOSIS  Diagnosis: Unstable angina  Assessment and Plan of Treatment:   You underwent a cardiac catheterization ___ and the blockage in your ___ as opened with stent placement.NEVER MISS A DOSE OF ASPIRIN OR PLAVIX; IF YOU DO, YOU ARE AT RISK OF YOUR STENT CLOSING AND HAVING A HEART ATTACK. DO NOT STOP THESE TWO MEDICATIONS UNLESS INSTRUCTED TO DO SO BY YOUR CARDIOLOGIST  •	Your procedure was done through your groin or your wrist  •	You do not need to keep this area covered and you may shower  •	Please avoid any heavy lifting  (no more than 3 to 5 lbs) or strenuous activity for five days  •	If you develop any swelling, bleeding, hardening of the skin (hematoma formation), acute pain, numbness/tingling  in your arm or leg please contact your doctor immediately or call our 24/7 line: [phone]        SECONDARY DISCHARGE DIAGNOSES  Diagnosis: Hyperlipidemia  Assessment and Plan of Treatment: Please continue to take Lipitor 40 mg daily.     PRINCIPAL DISCHARGE DIAGNOSIS  Diagnosis: Unstable angina  Assessment and Plan of Treatment: You presented to the ER with chest pain. Your heart enzymes were negative for heart attack. You underwent a cardiac catheterization which showed that your prior stent is open and there are no significant blockages. NEVER MISS A DOSE OF ASPIRIN OR PLAVIX; IF YOU DO, YOU ARE AT RISK OF YOUR STENT CLOSING AND HAVING A HEART ATTACK. DO NOT STOP THESE TWO MEDICATIONS UNLESS INSTRUCTED TO DO SO BY YOUR CARDIOLOGIST  •	Your procedure was done through your right wrist   •	You do not need to keep this area covered and you may shower  •	Please avoid any heavy lifting  (no more than 3 to 5 lbs) or strenuous activity for five days  •	If you develop any swelling, bleeding, hardening of the skin (hematoma formation), acute pain, numbness/tingling  in your arm or leg please contact your doctor immediately or call our 24/7 line: [phone]  Follow-up with Dr. Smith in 1-2 weeks.      SECONDARY DISCHARGE DIAGNOSES  Diagnosis: Hyperlipidemia  Assessment and Plan of Treatment: Please continue to take Lipitor 40 mg daily.     PRINCIPAL DISCHARGE DIAGNOSIS  Diagnosis: Unstable angina  Assessment and Plan of Treatment: You presented to the ER with chest pain. Your heart enzymes were negative for heart attack. You underwent a cardiac catheterization which showed that your prior stent is open and there are no significant blockages. NEVER MISS A DOSE OF ASPIRIN OR PLAVIX; IF YOU DO, YOU ARE AT RISK OF YOUR STENT CLOSING AND HAVING A HEART ATTACK. DO NOT STOP THESE TWO MEDICATIONS UNLESS INSTRUCTED TO DO SO BY YOUR CARDIOLOGIST  •	Your procedure was done through your right wrist   •	You do not need to keep this area covered and you may shower  •	Please avoid any heavy lifting  (no more than 3 to 5 lbs) or strenuous activity for five days  •	If you develop any swelling, bleeding, hardening of the skin (hematoma formation), acute pain, numbness/tingling  in your arm or leg please contact your doctor immediately or call our 24/7 line: [phone]  Follow-up with Dr. Smith in 1-2 weeks.      SECONDARY DISCHARGE DIAGNOSES  Diagnosis: Hyperlipidemia  Assessment and Plan of Treatment: Please continue to take Lipitor 40 mg daily.    Diagnosis: Leukocytosis  Assessment and Plan of Treatment: You were found to have an elevated white blood cell count while admitted. You did not have a fever. Your repeat Covid and RVP was negative. Your Liver Function tests are normal. Have Repeat Labs drawn with PCP in 1-2 weeks.

## 2023-01-19 NOTE — DISCHARGE NOTE PROVIDER - CARE PROVIDER_API CALL
Feliz Smith  CARDIOVASCULAR DISEASE  [address] [place] [address], [place] [address]  Phone: [phone]  Fax: [phone]  Follow Up Time:

## 2023-01-19 NOTE — PROGRESS NOTE ADULT - NS ATTEND AMEND GEN_ALL_CORE FT
Initial attending contact date  1/19/23    . See PA note written above for details. I reviewed the PA documentation. I have personally seen and examined this patient. I reviewed vitals, labs, medications, cardiac studies, and additional imaging. I agree with the above PA's findings and plans as written above with the following additions/statements.    71 y/o M, with PMHX of CAD (s/p recently underwent PCI at [facility] 6/23/22: s/p JUON x 1 mRCA (85%); LM normal, mLAD mild diffuse, LCx normal, D1 mild dz, RPDA mild dz, EDP 8, access R TR w/ Dr. Marina), who presented to [facility] ED 1/18/23 with CC of CP, pt s/p CCTA 1/18/23:   In-stent stenosis of the mid RCA stent.  -Plan for cath today  -EKG nonischemic, trop negative  -ECHO normal LVEF  -Cont current meds  -Likely dc 1/20 with fu with Dr Smith

## 2023-01-19 NOTE — DISCHARGE NOTE PROVIDER - NSDCFUADDAPPT_GEN_ALL_CORE_FT
Please follow up with Dr. Smith in 1-2 week. Please call his office and make an appointment to see him.

## 2023-01-19 NOTE — PROGRESS NOTE ADULT - SUBJECTIVE AND OBJECTIVE BOX
INCOMPLETE    S: Pt seen and examined bedside.  Patient denies C/P, SOB, N/V, dizziness, palpitations, and diaphoresis.  Pt denies fever/chills, dysuria, abdominal pain, diarrhea, and cough  12 Point ROS otherwise negative except as per HPI/subjective.     O: Vital Signs Last 24 Hrs  T(C): 36.2 (2023 09:49), Max: 37.2 (2023 21:31)  T(F): 97.2 (2023 09:49), Max: 99 (2023 21:31)  HR: 64 (2023 05:19) (55 - 80)  BP: 106/56 (2023 05:19) (86/56 - 167/97)  BP(mean): 74 (2023 05:19) (74 - 120)  RR: 18 (2023 05:19) (16 - 20)  SpO2: 92% (2023 05:19) (92% - 98%)    Parameters below as of 2023 05:19  Patient On (Oxygen Delivery Method): room air        PHYSICAL EXAM:  GEN: NAD  HEENT: No JVD  PULM:  CTA B/L  CARD:  RRR, S1 and S2   ABD: +BS, NT, soft/ND	  EXT: No Edema B/L LE  NEURO: A+Ox3, no focal deficit  PSYCH: Mood Appropriate    LABS:                        13.7   9.12  )-----------( 306      ( 2023 10:12 )             39.7         137  |  101  |  33<H>  ----------------------------<  106<H>  3.9   |  26  |  1.19    Ca    9.5      2023 10:12  Mg     2.1         TPro  7.7  /  Alb  4.3  /  TBili  0.3  /  DBili  x   /  AST  21  /  ALT  29  /  AlkPhos  55  18    PT/INR - ( 2023 10:12 )   PT: 12.2 sec;   INR: 1.02          PTT - ( 2023 10:12 )  PTT:38.6 sec  Troponin T, Serum: 0.01 ng/mL (23 @ 19:39)  Troponin T, Serum: 0.01 ng/mL (23 @ 14:23)  Troponin T, Serum: <0.01 ng/mL (23 @ 10:12)       @ 07:  -   @ 07:00  --------------------------------------------------------  IN: 0 mL / OUT: 300 mL / NET: -300 mL     @ 07:  -   @ 10:09  --------------------------------------------------------  IN: 0 mL / OUT: 0 mL / NET: 0 mL      Daily Height in cm: 172.72 (2023 00:52)    Daily Weight in k.5 (2023 00:59)   CC: CP    S: Pt seen and examined bedside. Patient denies C/P, SOB, N/V, dizziness, palpitations, and diaphoresis.  Pt denies fever/chills, dysuria, abdominal pain, diarrhea, and cough  12 Point ROS otherwise negative except as per HPI/subjective.     O: Vital Signs Last 24 Hrs  T(C): 36.2 (2023 09:49), Max: 37.2 (2023 21:31)  T(F): 97.2 (2023 09:49), Max: 99 (2023 21:31)  HR: 64 (2023 05:19) (55 - 80)  BP: 106/56 (2023 05:19) (86/56 - 167/97)  BP(mean): 74 (2023 05:19) (74 - 120)  RR: 18 (2023 05:19) (16 - 20)  SpO2: 92% (2023 05:19) (92% - 98%)    Parameters below as of 2023 05:19  Patient On (Oxygen Delivery Method): room air        PHYSICAL EXAM:  GEN: NAD  HEENT: No JVD  PULM:  CTA B/L  CARD:  RRR, S1 and S2   ABD: +BS, NT, soft/ND	  EXT: No Edema B/L LE  NEURO: A+Ox3, no focal deficit  PSYCH: Mood Appropriate    LABS:                        13.7   9.12  )-----------( 306      ( 2023 10:12 )             39.7         137  |  101  |  33<H>  ----------------------------<  106<H>  3.9   |  26  |  1.19    Ca    9.5      2023 10:12  Mg     2.1         TPro  7.7  /  Alb  4.3  /  TBili  0.3  /  DBili  x   /  AST  21  /  ALT  29  /  AlkPhos  55  18    PT/INR - ( 2023 10:12 )   PT: 12.2 sec;   INR: 1.02          PTT - ( 2023 10:12 )  PTT:38.6 sec  Troponin T, Serum: 0.01 ng/mL (23 @ 19:39)  Troponin T, Serum: 0.01 ng/mL (23 @ 14:23)  Troponin T, Serum: <0.01 ng/mL (23 @ 10:12)       @ 07:01  -   @ 07:00  --------------------------------------------------------  IN: 0 mL / OUT: 300 mL / NET: -300 mL     @ 07:  -   @ 10:09  --------------------------------------------------------  IN: 0 mL / OUT: 0 mL / NET: 0 mL      Daily Height in cm: 172.72 (2023 00:52)    Daily Weight in k.5 (2023 00:59)

## 2023-01-20 ENCOUNTER — TRANSCRIPTION ENCOUNTER (OUTPATIENT)
Age: 73
End: 2023-01-20

## 2023-01-20 VITALS — TEMPERATURE: 97 F

## 2023-01-20 LAB
A1C WITH ESTIMATED AVERAGE GLUCOSE RESULT: 5.5 % — SIGNIFICANT CHANGE UP (ref 4–5.6)
ALBUMIN SERPL ELPH-MCNC: 3.7 G/DL — SIGNIFICANT CHANGE UP (ref 3.3–5)
ALP SERPL-CCNC: 87 U/L — SIGNIFICANT CHANGE UP (ref 40–120)
ALT FLD-CCNC: 22 U/L — SIGNIFICANT CHANGE UP (ref 10–45)
ANION GAP SERPL CALC-SCNC: 9 MMOL/L — SIGNIFICANT CHANGE UP (ref 5–17)
ANISOCYTOSIS BLD QL: SLIGHT — SIGNIFICANT CHANGE UP
AST SERPL-CCNC: 27 U/L — SIGNIFICANT CHANGE UP (ref 10–40)
BASOPHILS # BLD AUTO: 0 K/UL — SIGNIFICANT CHANGE UP (ref 0–0.2)
BASOPHILS NFR BLD AUTO: 0 % — SIGNIFICANT CHANGE UP (ref 0–2)
BILIRUB DIRECT SERPL-MCNC: 0.2 MG/DL — SIGNIFICANT CHANGE UP (ref 0–0.3)
BILIRUB INDIRECT FLD-MCNC: 0.4 MG/DL — SIGNIFICANT CHANGE UP (ref 0.2–1)
BILIRUB SERPL-MCNC: 0.6 MG/DL — SIGNIFICANT CHANGE UP (ref 0.2–1.2)
BUN SERPL-MCNC: 17 MG/DL — SIGNIFICANT CHANGE UP (ref 7–23)
CALCIUM SERPL-MCNC: 8.7 MG/DL — SIGNIFICANT CHANGE UP (ref 8.4–10.5)
CHLORIDE SERPL-SCNC: 101 MMOL/L — SIGNIFICANT CHANGE UP (ref 96–108)
CHOLEST SERPL-MCNC: 80 MG/DL — SIGNIFICANT CHANGE UP
CO2 SERPL-SCNC: 25 MMOL/L — SIGNIFICANT CHANGE UP (ref 22–31)
CREAT SERPL-MCNC: 0.82 MG/DL — SIGNIFICANT CHANGE UP (ref 0.5–1.3)
EGFR: 93 ML/MIN/1.73M2 — SIGNIFICANT CHANGE UP
EOSINOPHIL # BLD AUTO: 0 K/UL — SIGNIFICANT CHANGE UP (ref 0–0.5)
EOSINOPHIL NFR BLD AUTO: 0 % — SIGNIFICANT CHANGE UP (ref 0–6)
ESTIMATED AVERAGE GLUCOSE: 111 MG/DL — SIGNIFICANT CHANGE UP (ref 68–114)
GLUCOSE SERPL-MCNC: 107 MG/DL — HIGH (ref 70–99)
HCT VFR BLD CALC: 35.5 % — LOW (ref 39–50)
HDLC SERPL-MCNC: 33 MG/DL — LOW
HGB BLD-MCNC: 12.3 G/DL — LOW (ref 13–17)
LIPID PNL WITH DIRECT LDL SERPL: 34 MG/DL — SIGNIFICANT CHANGE UP
LYMPHOCYTES # BLD AUTO: 1.52 K/UL — SIGNIFICANT CHANGE UP (ref 1–3.3)
LYMPHOCYTES # BLD AUTO: 8.7 % — LOW (ref 13–44)
MAGNESIUM SERPL-MCNC: 1.9 MG/DL — SIGNIFICANT CHANGE UP (ref 1.6–2.6)
MANUAL SMEAR VERIFICATION: SIGNIFICANT CHANGE UP
MCHC RBC-ENTMCNC: 32.5 PG — SIGNIFICANT CHANGE UP (ref 27–34)
MCHC RBC-ENTMCNC: 34.6 GM/DL — SIGNIFICANT CHANGE UP (ref 32–36)
MCV RBC AUTO: 93.7 FL — SIGNIFICANT CHANGE UP (ref 80–100)
MICROCYTES BLD QL: SLIGHT — SIGNIFICANT CHANGE UP
MONOCYTES # BLD AUTO: 0.61 K/UL — SIGNIFICANT CHANGE UP (ref 0–0.9)
MONOCYTES NFR BLD AUTO: 3.5 % — SIGNIFICANT CHANGE UP (ref 2–14)
NEUTROPHILS # BLD AUTO: 15.32 K/UL — HIGH (ref 1.8–7.4)
NEUTROPHILS NFR BLD AUTO: 87.8 % — HIGH (ref 43–77)
NON HDL CHOLESTEROL: 47 MG/DL — SIGNIFICANT CHANGE UP
PLAT MORPH BLD: NORMAL — SIGNIFICANT CHANGE UP
PLATELET # BLD AUTO: 241 K/UL — SIGNIFICANT CHANGE UP (ref 150–400)
POLYCHROMASIA BLD QL SMEAR: SLIGHT — SIGNIFICANT CHANGE UP
POTASSIUM SERPL-MCNC: 3.9 MMOL/L — SIGNIFICANT CHANGE UP (ref 3.5–5.3)
POTASSIUM SERPL-SCNC: 3.9 MMOL/L — SIGNIFICANT CHANGE UP (ref 3.5–5.3)
PROT SERPL-MCNC: 6.8 G/DL — SIGNIFICANT CHANGE UP (ref 6–8.3)
RAPID RVP RESULT: SIGNIFICANT CHANGE UP
RBC # BLD: 3.79 M/UL — LOW (ref 4.2–5.8)
RBC # FLD: 13.9 % — SIGNIFICANT CHANGE UP (ref 10.3–14.5)
RBC BLD AUTO: ABNORMAL
SARS-COV-2 RNA SPEC QL NAA+PROBE: SIGNIFICANT CHANGE UP
SARS-COV-2 RNA SPEC QL NAA+PROBE: SIGNIFICANT CHANGE UP
SODIUM SERPL-SCNC: 135 MMOL/L — SIGNIFICANT CHANGE UP (ref 135–145)
SPHEROCYTES BLD QL SMEAR: SLIGHT — SIGNIFICANT CHANGE UP
TRIGL SERPL-MCNC: 64 MG/DL — SIGNIFICANT CHANGE UP
WBC # BLD: 17.45 K/UL — HIGH (ref 3.8–10.5)
WBC # FLD AUTO: 17.45 K/UL — HIGH (ref 3.8–10.5)

## 2023-01-20 PROCEDURE — 82553 CREATINE MB FRACTION: CPT

## 2023-01-20 PROCEDURE — 0225U NFCT DS DNA&RNA 21 SARSCOV2: CPT

## 2023-01-20 PROCEDURE — 80048 BASIC METABOLIC PNL TOTAL CA: CPT

## 2023-01-20 PROCEDURE — 93010 ELECTROCARDIOGRAM REPORT: CPT

## 2023-01-20 PROCEDURE — 80061 LIPID PANEL: CPT

## 2023-01-20 PROCEDURE — C1769: CPT

## 2023-01-20 PROCEDURE — 99285 EMERGENCY DEPT VISIT HI MDM: CPT

## 2023-01-20 PROCEDURE — 71045 X-RAY EXAM CHEST 1 VIEW: CPT

## 2023-01-20 PROCEDURE — 83880 ASSAY OF NATRIURETIC PEPTIDE: CPT

## 2023-01-20 PROCEDURE — 85730 THROMBOPLASTIN TIME PARTIAL: CPT

## 2023-01-20 PROCEDURE — 81003 URINALYSIS AUTO W/O SCOPE: CPT

## 2023-01-20 PROCEDURE — 85610 PROTHROMBIN TIME: CPT

## 2023-01-20 PROCEDURE — C1894: CPT

## 2023-01-20 PROCEDURE — 36415 COLL VENOUS BLD VENIPUNCTURE: CPT

## 2023-01-20 PROCEDURE — 74174 CTA ABD&PLVS W/CONTRAST: CPT | Mod: MA

## 2023-01-20 PROCEDURE — U0005: CPT

## 2023-01-20 PROCEDURE — 82550 ASSAY OF CK (CPK): CPT

## 2023-01-20 PROCEDURE — 93306 TTE W/DOPPLER COMPLETE: CPT

## 2023-01-20 PROCEDURE — 96375 TX/PRO/DX INJ NEW DRUG ADDON: CPT

## 2023-01-20 PROCEDURE — 83036 HEMOGLOBIN GLYCOSYLATED A1C: CPT

## 2023-01-20 PROCEDURE — 80053 COMPREHEN METABOLIC PANEL: CPT

## 2023-01-20 PROCEDURE — 93005 ELECTROCARDIOGRAM TRACING: CPT

## 2023-01-20 PROCEDURE — 99239 HOSP IP/OBS DSCHRG MGMT >30: CPT

## 2023-01-20 PROCEDURE — 85027 COMPLETE CBC AUTOMATED: CPT

## 2023-01-20 PROCEDURE — 96374 THER/PROPH/DIAG INJ IV PUSH: CPT

## 2023-01-20 PROCEDURE — U0003: CPT

## 2023-01-20 PROCEDURE — C1887: CPT

## 2023-01-20 PROCEDURE — 71275 CT ANGIOGRAPHY CHEST: CPT | Mod: MA

## 2023-01-20 PROCEDURE — 84484 ASSAY OF TROPONIN QUANT: CPT

## 2023-01-20 PROCEDURE — 85025 COMPLETE CBC W/AUTO DIFF WBC: CPT

## 2023-01-20 PROCEDURE — 87635 SARS-COV-2 COVID-19 AMP PRB: CPT

## 2023-01-20 PROCEDURE — 80076 HEPATIC FUNCTION PANEL: CPT

## 2023-01-20 PROCEDURE — 83735 ASSAY OF MAGNESIUM: CPT

## 2023-01-20 PROCEDURE — 75574 CT ANGIO HRT W/3D IMAGE: CPT | Mod: MA

## 2023-01-20 RX ORDER — SIMETHICONE 80 MG/1
80 TABLET, CHEWABLE ORAL ONCE
Refills: 0 | Status: COMPLETED | OUTPATIENT
Start: 2023-01-20 | End: 2023-01-20

## 2023-01-20 RX ORDER — POLYETHYLENE GLYCOL 3350 17 G/17G
17 POWDER, FOR SOLUTION ORAL DAILY
Refills: 0 | Status: DISCONTINUED | OUTPATIENT
Start: 2023-01-20 | End: 2023-01-20

## 2023-01-20 RX ORDER — MAGNESIUM OXIDE 400 MG ORAL TABLET 241.3 MG
400 TABLET ORAL ONCE
Refills: 0 | Status: COMPLETED | OUTPATIENT
Start: 2023-01-20 | End: 2023-01-20

## 2023-01-20 RX ORDER — POTASSIUM CHLORIDE 20 MEQ
20 PACKET (EA) ORAL ONCE
Refills: 0 | Status: COMPLETED | OUTPATIENT
Start: 2023-01-20 | End: 2023-01-20

## 2023-01-20 RX ORDER — FAMOTIDINE 10 MG/ML
20 INJECTION INTRAVENOUS ONCE
Refills: 0 | Status: COMPLETED | OUTPATIENT
Start: 2023-01-20 | End: 2023-01-20

## 2023-01-20 RX ORDER — PANTOPRAZOLE SODIUM 20 MG/1
40 TABLET, DELAYED RELEASE ORAL ONCE
Refills: 0 | Status: COMPLETED | OUTPATIENT
Start: 2023-01-20 | End: 2023-01-20

## 2023-01-20 RX ORDER — PANTOPRAZOLE SODIUM 20 MG/1
1 TABLET, DELAYED RELEASE ORAL
Qty: 30 | Refills: 0
Start: 2023-01-20 | End: 2023-02-18

## 2023-01-20 RX ORDER — SENNA PLUS 8.6 MG/1
2 TABLET ORAL AT BEDTIME
Refills: 0 | Status: DISCONTINUED | OUTPATIENT
Start: 2023-01-20 | End: 2023-01-20

## 2023-01-20 RX ORDER — METOCLOPRAMIDE HCL 10 MG
10 TABLET ORAL ONCE
Refills: 0 | Status: COMPLETED | OUTPATIENT
Start: 2023-01-20 | End: 2023-01-20

## 2023-01-20 RX ADMIN — PANTOPRAZOLE SODIUM 40 MILLIGRAM(S): 20 TABLET, DELAYED RELEASE ORAL at 10:02

## 2023-01-20 RX ADMIN — POLYETHYLENE GLYCOL 3350 17 GRAM(S): 17 POWDER, FOR SOLUTION ORAL at 12:29

## 2023-01-20 RX ADMIN — Medication 10 MILLIGRAM(S): at 10:10

## 2023-01-20 RX ADMIN — CLOPIDOGREL BISULFATE 75 MILLIGRAM(S): 75 TABLET, FILM COATED ORAL at 12:28

## 2023-01-20 RX ADMIN — Medication 30 MILLILITER(S): at 01:46

## 2023-01-20 RX ADMIN — Medication 81 MILLIGRAM(S): at 12:28

## 2023-01-20 RX ADMIN — Medication 20 MILLIEQUIVALENT(S): at 10:02

## 2023-01-20 RX ADMIN — FAMOTIDINE 20 MILLIGRAM(S): 10 INJECTION INTRAVENOUS at 02:19

## 2023-01-20 RX ADMIN — Medication 10 MILLIGRAM(S): at 04:05

## 2023-01-20 RX ADMIN — MAGNESIUM OXIDE 400 MG ORAL TABLET 400 MILLIGRAM(S): 241.3 TABLET ORAL at 10:05

## 2023-01-20 RX ADMIN — SIMETHICONE 80 MILLIGRAM(S): 80 TABLET, CHEWABLE ORAL at 12:28

## 2023-01-20 NOTE — DISCHARGE NOTE NURSING/CASE MANAGEMENT/SOCIAL WORK - PATIENT PORTAL LINK FT
You can access the FollowMyHealth Patient Portal offered by [facility] by registering at the following website: [email or web address]. By joining [facility]’s FollowMyHealth portal, you will also be able to view your health information using other applications (apps) compatible with our system.

## 2023-01-25 DIAGNOSIS — Z20.822 CONTACT WITH AND (SUSPECTED) EXPOSURE TO COVID-19: ICD-10-CM

## 2023-01-25 DIAGNOSIS — E78.5 HYPERLIPIDEMIA, UNSPECIFIED: ICD-10-CM

## 2023-01-25 DIAGNOSIS — Z79.899 OTHER LONG TERM (CURRENT) DRUG THERAPY: ICD-10-CM

## 2023-01-25 DIAGNOSIS — Z79.82 LONG TERM (CURRENT) USE OF ASPIRIN: ICD-10-CM

## 2023-01-25 DIAGNOSIS — I24.9 ACUTE ISCHEMIC HEART DISEASE, UNSPECIFIED: ICD-10-CM

## 2023-01-25 DIAGNOSIS — I25.110 ATHEROSCLEROTIC HEART DISEASE OF NATIVE CORONARY ARTERY WITH UNSTABLE ANGINA PECTORIS: ICD-10-CM

## 2023-01-25 DIAGNOSIS — K21.9 GASTRO-ESOPHAGEAL REFLUX DISEASE WITHOUT ESOPHAGITIS: ICD-10-CM

## 2023-01-25 DIAGNOSIS — Z79.02 LONG TERM (CURRENT) USE OF ANTITHROMBOTICS/ANTIPLATELETS: ICD-10-CM

## 2023-01-25 DIAGNOSIS — D72.829 ELEVATED WHITE BLOOD CELL COUNT, UNSPECIFIED: ICD-10-CM

## 2023-01-25 DIAGNOSIS — Z95.5 PRESENCE OF CORONARY ANGIOPLASTY IMPLANT AND GRAFT: ICD-10-CM

## 2023-06-08 NOTE — CONSULT NOTE ADULT - CONSULT REQUESTED DATE/TIME
[address] [address]  Phone: [phone]  Fax: [phone]  Primary Provider: Nikolas Tejada  Pre-op Performing Provider: PERRI SEBASTIAN      PREOPERATIVE EVALUATION:  Today's date: 6/8/2023    Patricia Mckeon is a 64 year old female who presents for a preoperative evaluation.      6/8/2023     1:44 PM   Additional Questions   Roomed by Dany RAMIREZ MA     Surgical Information:  Surgery/Procedure:Hip replacement   Surgery Location: [facility]  Surgeon: Dr. Mcgee   Surgery Date: 06/28/2023  Time of Surgery: unknown  Where patient plans to recover: At home with family  Fax number for surgical facility: [phone]    Assessment & Plan     The proposed surgical procedure is considered INTERMEDIATE risk.    Preop general physical exam  Patient presents for per-opt clearance for a total hip surgery. She has had joint procedures in the past. She has no questions or concerns. Lab work today is WNL. Discussed medication hold times and my recommendations. She was told to continue phentermine by her weight management provider, but based on the recommendations today, which is to hold, she will reach out and clarify how to take per the weight management team and surgeon. No findings of concern on exam outside those discussed below (see below on heart murmur on ascultation).   - EKG 12-lead, tracing only  - CBC with platelets  - Comprehensive metabolic panel (BMP + Alb, Alk Phos, ALT, AST, Total. Bili, TP)    Heart murmur  Grade 2/6 heart murmur appreciated on exam. [place] best at the left sternal boarder. Patient reports no SOB, chest pain, peripheral edema, orthopnea, or heart palpitations. No dizziness or syncope. Asymptomatic. Offered echocardiogram prior to OR, but patient declines at this time. Since it is asymptomatic no further work-up needed at this time. Discussed s/s of cardiac disease with patient. She verbalized understanding and if any  symptoms develop she will reach out.     Screening for hyperlipidemia  Patient due for lipid screening. Discussed. She agrees. Ordered.   - Lipid Profile (Chol, Trig, HDL, LDL calc)    Recent Labs   Lab Test 06/08/23  1501   CHOL 206*   HDL 45*   *   TRIG 139     The 10-year ASCVD risk score (Tiffany SANTILLAN, et al., 2019) is: 5.6%    Values used to calculate the score:      Age: 64 years      Sex: Female      Is Non- : No      Diabetic: No      Tobacco smoker: No      Systolic Blood Pressure: 128 mmHg      Is BP treated: No      HDL Cholesterol: 45 mg/dL      Total Cholesterol: 206 mg/dL    Skin tag  Patient has one skin tag to her right buttock and another to her left side of her neck she would like removed. On exam, surrounding redness is observed and she reports irritation with clothing/jewlery. See procedure note below. Consent obtained prior.  Removed without complication. Did discuss that they skin tag could return and grow back. Wound care instructions given. Follow up as needed.  - lidocaine 1% with EPINEPHrine 1:100,000 injection 1 mL    Procedure Note:     Discussed with Patricia regarding technique, risk of infection, and scarring. Betadine is used for cleansing. Lidocaine with epinephrine is used for anesthesia to the skin tag to the right buttock. No anesthesia was used for the small skin tag on her neck. Skin tags removed by sterile scissors. Bandage applied. Patricia tolerated procedure well. No complications.     - No identified additional risk factors other than previously addressed    Antiplatelet or Anticoagulation Medication Instructions:   - aspirin: Discontinue aspirin 7-10 days prior to procedure to reduce bleeding risk. It should be resumed postoperatively.     Additional Medication Instructions:   - Antiepileptics: Continue without modification.   - phentermine: HOLD 7 days prior to surgery.   - Herbal medications and vitamins: HOLD 14 days prior to  surgery.    RECOMMENDATION:  APPROVAL GIVEN to proceed with proposed procedure, without further diagnostic evaluation.    Subjective       HPI related to upcoming procedure: Having  Hip pain, has seen the orthopedic surgeon, they are going to do a total hip replacement.     She also would like two skin tags removed today. She has 2 skin tag(s) that she would like removed.  They have become irritated by rubbing from clothing/jewlery. There is a large skin tag on her right buttock that is catching and pulling on clothing. There is a smaller skin tag on her neck that she catches if she wears a necklace and it is painful.         6/5/2023     1:07 PM   Preop Questions   1. Have you ever had a heart attack or stroke? No   2. Have you ever had surgery on your heart or blood vessels, such as a stent placement, a coronary artery bypass, or surgery on an artery in your head, neck, heart, or legs? No   3. Do you have chest pain with activity? No   4. Do you have a history of  heart failure? No   5. Do you currently have a cold, bronchitis or symptoms of other infection? No   6. Do you have a cough, shortness of breath, or wheezing? No   7. Do you or anyone in your family have previous history of blood clots? No   8. Do you or does anyone in your family have a serious bleeding problem such as prolonged bleeding following surgeries or cuts? No   9. Have you ever had problems with anemia or been told to take iron pills? No   10. Have you had any abnormal blood loss such as black, tarry or bloody stools, or abnormal vaginal bleeding? No   11. Have you ever had a blood transfusion? No   12. Are you willing to have a blood transfusion if it is medically needed before, during, or after your surgery? Yes   13. Have you or any of your relatives ever had problems with anesthesia? No   14. Do you have sleep apnea, excessive snoring or daytime drowsiness? No   15. Do you have any artifical heart valves or other implanted medical devices  like a pacemaker, defibrillator, or continuous glucose monitor? No   16. Do you have artificial joints? YES - Left hip is done   17. Are you allergic to latex? No       Health Care Directive:  Patient does not have a Health Care Directive or Living Will: Discussed advance care planning with patient; however, patient declined at this time.    Preoperative Review of :   reviewed - controlled substances reflected in medication list.      Review of Systems  CONSTITUTIONAL: NEGATIVE for fever, chills, change in weight  INTEGUMENTARY/SKIN: NEGATIVE for worrisome rashes, moles or lesions  EYES: NEGATIVE for vision changes or irritation  ENT/MOUTH: NEGATIVE for ear, mouth and throat problems  RESP: NEGATIVE for significant cough or SOB  CV: NEGATIVE for chest pain, palpitations or peripheral edema  GI: NEGATIVE for nausea, abdominal pain, heartburn, or change in bowel habits  : NEGATIVE for frequency, dysuria, or hematuria  MUSCULOSKELETAL: NEGATIVE for significant arthralgias or myalgia outside of hip pain that is being corrected with surgery  NEURO: NEGATIVE for weakness, dizziness or paresthesias  ENDOCRINE: NEGATIVE for temperature intolerance, skin/hair changes  SKIN: No rashes or lesions of concern. She does have a skin tag on her right buttock that has been catching and bothersome. She has another on her neck. Both are erythemic.   HEME: NEGATIVE for bleeding problems  PSYCHIATRIC: NEGATIVE for changes in mood or affect    Patient Active Problem List    Diagnosis Date Noted     Gastroesophageal reflux disease without esophagitis 10/06/2022     Priority: Medium     Dyslipidemia 10/06/2022     Priority: Medium     Morbid obesity (H) 10/06/2022     Priority: Medium      Past Medical History:   Diagnosis Date     Dyslipidemia      Gastroesophageal reflux disease without esophagitis      Osteoarthritis     knees and hips needing replacement     Past Surgical History:   Procedure Laterality Date     ARTHROPLASTY  27-Jun-2022 22:42 HIP Left      CHOLECYSTECTOMY       HEEL SPUR SURGERY Right      LASIK       WISDOM TOOTH EXTRACTION       Current Outpatient Medications   Medication Sig Dispense Refill     Aspirin 81 MG CAPS        calcium carbonate (OS-RAJANI) 600 MG tablet Take by mouth 2 times daily (with meals)       cane Liz [CANE LIZ] Wide Base Quad Cane for home use. For 12 weeks.       cholecalciferol (VITAMIN D3) 125 mcg (5000 units) capsule Take by mouth daily       cyanocobalamin (VITAMIN B-12) 1000 MCG SUBL sublingual tablet Place 1 tablet (1,000 mcg) under the tongue daily 90 tablet 3     Garlic 1000 MG CAPS        liraglutide - Weight Management (SAXENDA) 18 MG/3ML pen Inject 0.6 mg Subcutaneous daily for 7 days, THEN 1.2 mg daily for 7 days, THEN 1.8 mg daily for 7 days, THEN 2.4 mg daily for 7 days, THEN 3 mg daily. (Patient not taking: Reported on 1/6/2023) 45 mL 3     Omega-3 Fatty Acids (FISH OIL) 1360 MG CAPS        omeprazole (PRILOSEC) 20 MG capsule [OMEPRAZOLE (PRILOSEC) 20 MG CAPSULE] Take 20 mg by mouth.       topiramate (TOPAMAX) 25 MG tablet Take 1 tablet (25 mg) by mouth 2 times daily 180 tablet 3     UNABLE TO FIND MEDICATION NAME: Move Free Triple Action - joints       vitamin C (ASCORBIC ACID) 1000 MG TABS Take 1,000 mg by mouth daily         Allergies   Allergen Reactions     Oxycodone-Acetaminophen Itching        Social History     Tobacco Use     Smoking status: Never     Smokeless tobacco: Never   Vaping Use     Vaping status: Not on file   Substance Use Topics     Alcohol use: Yes     Comment: special occasions only     Family History   Problem Relation Age of Onset     Cancer Mother      Multiple myeloma Mother      Cancer Father      Obesity Father      Lung Cancer Father      Osteoarthritis Sister      Osteoarthritis Sister      Osteoarthritis Brother      History   Drug Use Unknown         Objective     /70 (BP Location: Right arm, Patient Position: Sitting, Cuff Size: Adult Regular)   Pulse 88    "Temp 98.4  F (36.9  C) (Oral)   Resp 18   Ht 1.626 m (5' 4\")   Wt 105.5 kg (232 lb 9.6 oz)   LMP  (LMP Unknown)   BMI 39.93 kg/m      Physical Exam    GENERAL APPEARANCE: healthy, alert and no distress     EYES: EOMI, PERRL     HENT: ear canals and TM's normal and nose and mouth without ulcers or lesions     NECK: no adenopathy, no asymmetry, masses, or scars and thyroid normal to palpation     RESP: lungs clear to auscultation - no rales, rhonchi or wheezes     CV: regular rates and rhythm, normal S1 S2, no S3 or S4. 2/6 murmur auscultated. No click or rub. No peripheral edema.      ABDOMEN:  soft, nontender, no HSM or masses and bowel sounds normal     MS: extremities normal- no gross deformities noted, no evidence of inflammation in joints, FROM in all extremities.     SKIN: no suspicious lesions or rashes. Skin tag present to right buttock, base is about 0.5 cm in length and tag is about the size of a dime. Tiny skin tag present on left side of neck.      NEURO: Normal strength and tone, sensory exam grossly normal, mentation intact and speech normal     PSYCH: mentation appears normal. and affect normal/bright     LYMPHATICS: No cervical adenopathy      Diagnostics:  Recent Results (from the past 720 hour(s))   EKG 12-lead, tracing only    Collection Time: 06/08/23  2:27 PM   Result Value Ref Range    Systolic Blood Pressure  mmHg    Diastolic Blood Pressure  mmHg    Ventricular Rate 82 BPM    Atrial Rate 82 BPM    KY Interval 180 ms    QRS Duration 86 ms     ms    QTc 422 ms    P Axis 58 degrees    R AXIS 10 degrees    T Axis 45 degrees    Interpretation ECG       Sinus rhythm  Normal ECG  No previous ECGs available  Confirmed by GAGAN CRUZ MD LOC:[facility] ([id number]) on 6/8/2023 3:48:44 PM     CBC with platelets    Collection Time: 06/08/23  3:01 PM   Result Value Ref Range    WBC Count 8.0 4.0 - 11.0 10e3/uL    RBC Count 4.80 3.80 - 5.20 10e6/uL    Hemoglobin 14.7 11.7 - 15.7 g/dL    Hematocrit 43.3 35.0 - " 47.0 %    MCV 90 78 - 100 fL    MCH 30.6 26.5 - 33.0 pg    MCHC 33.9 31.5 - 36.5 g/dL    RDW 12.2 10.0 - 15.0 %    Platelet Count 312 150 - 450 10e3/uL   Comprehensive metabolic panel (BMP + Alb, Alk Phos, ALT, AST, Total. Bili, TP)    Collection Time: 06/08/23  3:01 PM   Result Value Ref Range    Sodium 139 136 - 145 mmol/L    Potassium 4.4 3.4 - 5.3 mmol/L    Chloride 103 98 - 107 mmol/L    Carbon Dioxide (CO2) 24 22 - 29 mmol/L    Anion Gap 12 7 - 15 mmol/L    Urea Nitrogen 19.4 8.0 - 23.0 mg/dL    Creatinine 0.77 0.51 - 0.95 mg/dL    Calcium 9.3 8.8 - 10.2 mg/dL    Glucose 101 (H) 70 - 99 mg/dL    Alkaline Phosphatase 128 (H) 35 - 104 U/L    AST 20 10 - 35 U/L    ALT 17 10 - 35 U/L    Protein Total 7.5 6.4 - 8.3 g/dL    Albumin 4.5 3.5 - 5.2 g/dL    Bilirubin Total 0.4 <=1.2 mg/dL    GFR Estimate 86 >60 mL/min/1.73m2   Lipid Profile (Chol, Trig, HDL, LDL calc)    Collection Time: 06/08/23  3:01 PM   Result Value Ref Range    Cholesterol 206 (H) <200 mg/dL    Triglycerides 139 <150 mg/dL    Direct Measure HDL 45 (L) >=50 mg/dL    LDL Cholesterol Calculated 133 (H) <=100 mg/dL    Non HDL Cholesterol 161 (H) <130 mg/dL      EKG: appears normal, NSR, normal axis, normal intervals, no acute ST/T changes c/w ischemia, no LVH by voltage criteria, unchanged from previous tracings    Revised Cardiac Risk Index (RCRI):  The patient has the following serious cardiovascular risks for perioperative complications:   - No serious cardiac risks = 0 points     RCRI Interpretation: 0 points: Class I (very low risk - 0.4% complication rate)    Signed Electronically by: SHEFALI Barragan CNP  Copy of this evaluation report is provided to requesting physician.

## 2023-06-12 NOTE — PATIENT PROFILE ADULT - HAVE YOU RECEIVED AT LEAST TWO PFIZER AND/OR MODERNA VACCINATIONS (IN ANY COMBINATION) AND/OR ONE JOHNSON & JOHNSON VACCINATION?
[de-identified] : ROLANDA is a 75 year female here today for bilateral shoulder pain. She notes she recently fell. She notes pain over the front of the shoulder.
Yes

## 2023-07-21 NOTE — PATIENT PROFILE ADULT - [employer]
Daily Progress Note    Assessment & Plan     Patient Active Problem List:     Normocytic anemia[D64.9]      Priority: Low [3]      Date Noted: 12/07/2018    Atherosclerotic heart disease of native coronary artery without angina pectoris[I25.10]      Priority: Low [3]      Date Noted: 12/07/2018    Cervical disc syndrome[M50.10]      Priority: Low [3]      Date Noted: 12/07/2018    Anxiety associated with depression[F41.8]      Priority: Low [3]      Date Noted: 12/07/2018    Muscle weakness of lower extremity[M62.81]      Priority: Low [3]      Date Noted: 12/07/2018    Cigarette nicotine dependence with nicotine-induced disorder[F17.219]      Priority: Low [3]      Date Noted: 12/07/2018    Peripheral neuropathy[G62.9]      Priority: Low [3]      Date Noted: 12/07/2018    PUD (peptic ulcer disease)[K27.9]      Priority: Low [3]      Date Noted: 12/07/2018    PAD (peripheral artery disease) (CMD)[I73.9]      Priority: Low [3]      Date Noted: 12/07/2018    S/P CABG (coronary artery bypass graft)[Z95.1]      Priority: Low [3]      Date Noted: 12/07/2018    Sciatica of left side[M54.32]      Priority: Low [3]      Date Noted: 12/07/2018    Spinal stenosis[M48.00]      Priority: Low [3]      Date Noted: 12/07/2018    Type 2 diabetes mellitus (CMD)[E11.9]      Priority: Low [3]      Date Noted: 12/07/2018    Systolic murmur[R01.1]      Priority: Low [3]      Date Noted: 04/17/2019    Right carotid bruit[R09.89]      Priority: Low [3]      Date Noted: 04/17/2019    Hyperkalemia[E87.5]      Priority: Low [3]      Date Noted: 07/15/2019    Stage 3 chronic kidney disease (CMD)[N18.30]      Priority: Low [3]      Date Noted: 07/15/2019    Trigger middle finger of right hand[M65.331]      Priority: Low [3]      Date Noted: 10/18/2019    HTN (hypertension)[I10]      Priority: Low [3]      Date Noted: 05/13/2016    Hyperlipidemia[E78.5]      Priority: Low [3]      Date Noted: 12/01/2019    Hypertension[I10]      Priority: Low  no [3]      Date Noted: 12/01/2019    Ischemia of lower extremity[I99.8]      Priority: Low [3]      Date Noted: 09/09/2015    Osteoporosis[M81.0]      Priority: Low [3]      Date Noted: 05/22/2012    Posttraumatic hematoma of right breast[S20.01XA]      Priority: Low [3]      Date Noted: 07/05/2016    Acute URI[J06.9]      Priority: Low [3]      Date Noted: 02/10/2020    Type 2 diabetes mellitus with hyperlipidemia (CMD)[E11.69, E78.5]      Priority: Low [3]      Date Noted: 03/13/2020    Gastroesophageal reflux disease without esophagitis[K21.9]      Priority: Low [3]      Date Noted: 06/12/2020    Routine physical examination[Z00.00]      Priority: Low [3]      Date Noted: 07/10/2020    Prolapse of female pelvic organs[N81.9]      Priority: Low [3]      Date Noted: 07/10/2020    Callus of foot[L84]      Priority: Low [3]      Date Noted: 08/10/2020    Mixed hyperlipidemia[E78.2]      Priority: Low [3]      Date Noted: 09/09/2020    Cellulitis of toe of right foot[L03.031]      Priority: Low [3]      Date Noted: 12/10/2020    Diabetic foot ulcer (CMD)[E11.621, L97.509]      Date Noted: 12/28/2020    Chronic left hip pain[M25.552, G89.29]      Priority: Low [3]      Date Noted: 03/09/2021    Peripheral arterial disease with history of revascularization (CMD)[I73.9, Z98.890]      Priority: Low [3]      Date Noted: 06/25/2021    Hypertension complicating diabetes (CMD)[E11.59, I15.2]      Priority: Low [3]      Date Noted: 07/08/2021    Gangrene associated with diabetes mellitus (CMD)[E11.52]      Priority: Low [3]      Date Noted: 07/08/2021    Acute non-recurrent frontal sinusitis[J01.10]      Priority: Low [3]      Date Noted: 08/06/2021    Gastroenteritis[K52.9]      Priority: Low [3]      Date Noted: 12/06/2021    Spinal stenosis of cervical region[M48.02]      Priority: Low [3]      Date Noted: 02/04/2022    Encounter for long-term opiate analgesic use[Z79.891]      Priority: Low [3]      Date Noted: 02/04/2022     Anxiety[F41.9]      Priority: Low [3]      Date Noted: 08/01/2022    Healthcare maintenance[Z00.00]      Priority: Low [3]      Date Noted: 08/31/2022    History of sleep apnea[Z86.69]      Priority: Low [3]      Date Noted: 09/21/2022    Stenosis of right carotid artery[I65.21]      Priority: Low [3]      Date Noted: 09/21/2022    Type 2 diabetes mellitus without complication, without long-term current use of insulin (CMD)[E11.9]      Priority: Low [3]      Date Noted: 09/25/2022    Acute on chronic anemia[D64.9]      Priority: Low [3]      Date Noted: 07/08/2023    Acute blood loss anemia[D62]      Priority: Low [3]      Date Noted: 07/08/2023    Diabetic foot ulcer associated with diabetes mellitus due to underlying condition, unspecified laterality, unspecified part of foot, unspecified ulcer stage (CMD)[E08.621, L97.509]      Priority: Low [3]      Date Noted: 07/13/2023    Essential hypertension[I10]      Priority: Low [3]      Date Noted: 07/14/2023    Hyponatremia[E87.1]      Priority: Low [3]      Date Noted: 07/14/2023    Acute kidney injury superimposed on chronic kidney disease (CMD)[N17.9, N18.9]      Priority: Low [3]      Date Noted: 07/14/2023    Elevated brain natriuretic peptide (BNP) level[R79.89]      Priority: Low [3]      Date Noted: 07/14/2023          Plan: Patient is a 61-year-old female who is well-known to our service.  She has a past medical history of coronary artery disease, diabetes, hypertension, high cholesterol, asthma, anxiety and neuropathy.  She has had a previous CABG.  She does smoke but does not drink.  She has followed in the wound center for the last 3 years for a ulcer to her right foot.  This is a neuropathic ulcer on the plantar surface related to her previous first through third toe amputation.  We followed this over the last few years and the wound will close but opens up again related to pressure bottom of her foot.  She has significant vascular disease and has  undergone multiple angiogram and angioplasties of her right leg.  Her blood flow is still fairly tenuous to her foot so no further surgery was recommended due to her significant vascular disease.  She did have an angiogram last Friday and developed a hematoma afterwards so she was admitted for a couple of days.  Over the last couple of weeks she has developed a wound to her left fifth toe which appears to be a diabetic ulcer.  The wound is open into her joint and she was admitted from the wound care center.  We will dress the area with Betadine and gauze.  She only has a callus to her right foot.  No dressings are needed for the right.  She has been started on antibiotic therapy for possible osteomyelitis to her foot.  Vascular surgery was consulted. She underwent angiogram  7/14/23 which revealed multifocal 80 - 90% stenosis of the left distal superficial femoral artery & popliteal artery; chronic total occlusion of the left posterior tibial artery and intervention was done to optimize patient for amputation. She developed hematoma in right groin after angiogram yesterday. Still with pressure dressing to right groin. She has some swelling to her legs and her legs do need to be elevated.  I did evaluate her this morning and examined her toe.  She still has an open wound into her joint space.  Things do look improved but my concern was that she may get a deeper infection that could spread to her foot particularly since her joint is open and bone is exposed.  This would put her at a higher risk for infection or even need for a higher amputation.  With this in mind I did recommend amputation of her left fifth toe.  We discussed the risk and benefits of surgery in detail including but limited to the risks of infection, bleeding, scarring as well as the possibility of poor healing to the area necessitating a higher amputation.    She did agree to proceed with surgery and she underwent a left fifth toe amputation on  7/17/2023.  Surgical dressing in place and will leave intact until Thursday.  Keep off of the left foot as much as possible.  Okay to resume blood thinners.  Await final bone cultures and discharge once antibiotic therapy has been finalized.  Discussed case with ID service as well as floor nurse.  Patient will follow in the wound center in 3 weeks.  Discharge held up due to potassium issues.  Hopefully can be discharged today    Subjective     Doing okay this morning and hoping to go home.  No fevers noted    Objective   Vitals with min/max:  Vital Last Value 24 Hour Range   Temperature 98.7 °F (37.1 °C) (07/21/23 0350) Temp  Min: 98 °F (36.7 °C)  Max: 98.7 °F (37.1 °C)   Pulse 63 (07/21/23 0350) Pulse  Min: 59  Max: 63   Respiratory 16 (07/21/23 0350) Resp  Min: 12  Max: 18   Non-Invasive  Blood Pressure (!) 148/67 (07/21/23 0350) BP  Min: 136/67  Max: 161/61   Pulse Oximetry 99 % (07/21/23 0350) SpO2  Min: 99 %  Max: 100 %   Arterial   Blood Pressure   No data recorded       Weight change: 1.3 kg (2 lb 13.9 oz)    Intake/Output last 3 shifts:  No intake/output data recorded.  Intake/Output this shift:  No intake/output data recorded.     Physical Exam:   Gen: well nourished, in no apparent distress  Head: normocephalic/atraumatic  Eyes: EOMI, pupils equal and round  ENT: nose and ears appear externally normal; moist mucous membranes  Neck: supple; no severe disturbance in ROM  CV: Regular rate and rhythm, S1/S2, no murmurs, rubs or gallops appreciated; no carotid bruits noted  Lungs: Clear to auscultation bilaterally, no wheezes or crackles appreciated.  Abd: Soft, nontender, nondistended, no guarding or rebound tenderness; no palpable masses appreciated  Extremities: No clubbing, cyanosis.  1+ edema to her legs.  Right first, second and third toe amputations healed.  Callus to the bottom of her right foot over the second metatarsal head.  No open wound.  Toenails are long to left foot.  Left fifth toe amputation  with stitches in place.  No redness or drainage  Skin: No rashes.  Callus only to the bottom of the right foot with some black discoloration medially on right foot .   Neuro: CN grossly intact; no focal deficits; muscle strength appears equal bilaterally.  Does have some sensory deficit to her feet bilaterally  Psych: Alert and oriented, no acute distress or anxiety, appropriate affect    Medications:  Scheduled  Current Facility-Administered Medications   Medication Dose Route Frequency Provider Last Rate Last Admin   • vancomycin (VANCOCIN) 750 mg in sodium chloride 0.9 % 250 mL IVPB  750 mg Intravenous Q48H Alex Gonzalez .7 mL/hr at 07/20/23 1612 750 mg at 07/20/23 1612   • aspirin (ECOTRIN) enteric coated tablet 81 mg  81 mg Oral Daily Moisés Trejo MD   81 mg at 07/20/23 0846   • clopidogrel (PLAVIX) tablet 75 mg  75 mg Oral Daily Moisés Trejo MD   75 mg at 07/20/23 0846   • insulin lispro (ADMELOG, HumaLOG) injection 5 Units  5 Units Subcutaneous TID  Dina Singh MD   5 Units at 07/20/23 1802   • ALPRAZolam (XANAX) tablet 0.5 mg  0.5 mg Oral TID Renay Carlson MD   0.5 mg at 07/20/23 2041   • **Patient's own medication stored in pharmacy needs to be picked up before discharge  1 each Oral See Admin Instructions Aby Gilbert MD       • cefepime (MAXIPIME) 1,000 mg in sodium chloride 0.9 % 100 mL IVPB  1,000 mg Intravenous 2 times per day Alex Gonzalez  mL/hr at 07/20/23 2056 1,000 mg at 07/20/23 2056   • amLODIPine (NORVASC) tablet 10 mg  10 mg Oral Daily Dina Singh MD   10 mg at 07/20/23 0847   • buPROPion (WELLBUTRIN SR) SR tablet 150 mg  150 mg Oral BID Dina Singh MD   150 mg at 07/20/23 2042   • carvedilol (COREG) tablet 6.25 mg  6.25 mg Oral BID  Dina Singh MD   6.25 mg at 07/20/23 1801   • gabapentin (NEURONTIN) capsule 300 mg  300 mg Oral TID Dina Singh MD   300 mg at 07/20/23 2042   • insulin lispro (ADMELOG,HumaLOG) - Correction Dose   Subcutaneous  TID WC Brandt Bar DO   1 Units at 07/19/23 1223   • insulin lispro (ADMELOG,HumaLOG) - Correction Dose   Subcutaneous Nightly Brandt Bar DO   2 Units at 07/14/23 2045   • sodium chloride (PF) 0.9 % injection 2 mL  2 mL Intracatheter 2 times per day Brandt Bar DO   2 mL at 07/20/23 2043   • Potassium Standard Replacement Protocol (Levels 3.5 and lower)   Does not apply See Admin Instructions Brandt Bar DO       • Magnesium Standard Replacement Protocol   Does not apply See Admin Instructions Brandt Bar DO       • Phosphorus Standard Replacement Protocol   Does not apply See Admin Instructions Brandt Bar DO       • heparin (porcine) injection 5,000 Units  5,000 Units Subcutaneous 3 times per day Brandt Bar DO   5,000 Units at 07/20/23 0638   • VANCOMYCIN - PHARMACIST MONITORED Misc   Does not apply See Admin Instructions Brandt Bar DO         Infusions  Current Facility-Administered Medications   Medication Dose Route Frequency Provider Last Rate Last Admin   • sodium chloride 0.9% infusion   Intravenous Continuous PRN Dina Singh MD         PRN  Current Facility-Administered Medications   Medication Dose Route Frequency Provider Last Rate Last Admin   • HYDROcodone-acetaminophen (NORCO) 5-325 MG per tablet 2 tablet  2 tablet Oral Q4H PRN Aby Gilbert MD   2 tablet at 07/21/23 0222   • HYDROmorphone (DILAUDID) injection 0.3 mg  0.3 mg Intravenous Q3H PRN Dina Singh MD       • sodium chloride 0.9% infusion   Intravenous Continuous PRN Dina Singh MD       • hydrALAZINE (APRESOLINE) tablet 25 mg  25 mg Oral Q6H PRN Aby Gilbert MD       • dextrose 50 % injection 25 g  25 g Intravenous PRN Brandt Bar DO       • dextrose 50 % injection 12.5 g  12.5 g Intravenous PRN Brandt Bar DO       • glucagon (GLUCAGEN) injection 1 mg  1 mg Intramuscular PRN Brandt Bar, DO       • dextrose (GLUTOSE) 40 % gel 15 g  15 g Oral PRN  Brandt Bar, DO       • dextrose (GLUTOSE) 40 % gel 30 g  30 g Oral PRN Brandt Bar H, DO       • sodium chloride 0.9 % flush bag 25 mL  25 mL Intravenous PRN Brandt Bar, DO       • ondansetron (ZOFRAN) injection 4 mg  4 mg Intravenous BID PRN Brandt Bar, DO       • acetaminophen (TYLENOL) tablet 650 mg  650 mg Oral Q4H PRN Brandt Bar, DO       • polyethylene glycol (MIRALAX) packet 17 g  17 g Oral Daily PRN Brandt Bar, DO       • docusate sodium-sennosides (SENOKOT S) 50-8.6 MG 2 tablet  2 tablet Oral Daily PRN Brandt Bar, DO       • bisacodyl (DULCOLAX) suppository 10 mg  10 mg Rectal Daily PRN Brandt Bar, DO       • magnesium hydroxide (MILK OF MAGNESIA) 400 MG/5ML suspension 30 mL  30 mL Oral Daily PRN Brandt Bar, DO       • sodium chloride 0.9 % flush bag 25 mL  25 mL Intravenous PRN Brandt Bar, DO            Results:  Labs:  Recent Labs   Lab 07/20/23  0540 07/18/23  0517 07/17/23  0549   WBC 6.7 7.8 6.8   HGB 7.1* 7.2* 7.0*    220 223     Recent Labs   Lab 07/20/23  1355 07/20/23  0540 07/18/23  0517 07/17/23  0549   SODIUM  --  132* 135 134*   POTASSIUM 4.7 5.6* 5.1 4.6   CHLORIDE  --  101 101 100   CO2  --  27 28 29   BUN  --  34* 29* 27*   CREATININE  --  1.63*  1.61* 1.52* 1.59*   GLUCOSE  --  103* 104* 110*   CALCIUM  --  8.3* 8.0* 8.1*       Imaging:  Cath/PV Case   Final Result      XR CHEST AP OR PA   Final Result   No evidence of acute cardiopulmonary pathology.      Electronically Signed by: CARLA MEDINA DO    Signed on: 7/13/2023 10:23 PM    Workstation ID: [id number]      XR FOOT MIN 3 VIEWS LEFT   Final Result   FINDINGS/IMPRESSION:   Diffuse osseous demineralization limits evaluation.  Redemonstrated   fracture at the 5th toe proximal phalanx.  Indistinct fracture fragments   and new periarticular cortical erosion at the lateral proximal phalanx may   represent superimposed osteomyelitis.  No other acute fracture or    destructive osseous lesion is appreciated.  Joint spaces are maintained.    Severe calcified atherosclerosis      Electronically Signed by: ARJ BROWN MD    Signed on: 7/13/2023 4:21 PM    Workstation ID: [id number]           Available Intravenous Access     PICC Line / CVC Line / PIV Line / Intraosseous Line / Line / UAC Line  Duration           Peripheral IV 07/13/23 Left Forearm 20 7 days                 Central Lines:       Urethral Catheter Lines:                      Patient with one or more new problems requiring additional work-up/treatment.

## 2023-08-09 ENCOUNTER — NON-APPOINTMENT (OUTPATIENT)
Age: 73
End: 2023-08-09

## 2023-08-11 ENCOUNTER — NON-APPOINTMENT (OUTPATIENT)
Age: 73
End: 2023-08-11

## 2023-08-30 ENCOUNTER — INPATIENT (INPATIENT)
Facility: HOSPITAL | Age: 73
LOS: 1 days | Discharge: HOME | DRG: 287 | End: 2023-09-01
Attending: INTERNAL MEDICINE | Admitting: INTERNAL MEDICINE
Payer: COMMERCIAL

## 2023-08-30 ENCOUNTER — TRANSCRIPTION ENCOUNTER (OUTPATIENT)
Age: 73
End: 2023-08-30

## 2023-08-30 VITALS
OXYGEN SATURATION: 98 % | TEMPERATURE: 98 F | WEIGHT: 205.03 LBS | RESPIRATION RATE: 16 BRPM | SYSTOLIC BLOOD PRESSURE: 167 MMHG | HEIGHT: 68 IN | DIASTOLIC BLOOD PRESSURE: 91 MMHG | HEART RATE: 64 BPM

## 2023-08-30 DIAGNOSIS — I20.0 UNSTABLE ANGINA: ICD-10-CM

## 2023-08-30 DIAGNOSIS — I10 ESSENTIAL (PRIMARY) HYPERTENSION: ICD-10-CM

## 2023-08-30 DIAGNOSIS — Z98.890 OTHER SPECIFIED POSTPROCEDURAL STATES: Chronic | ICD-10-CM

## 2023-08-30 DIAGNOSIS — E78.5 HYPERLIPIDEMIA, UNSPECIFIED: ICD-10-CM

## 2023-08-30 LAB
ANION GAP SERPL CALC-SCNC: 7 MMOL/L — SIGNIFICANT CHANGE UP (ref 5–17)
BASOPHILS # BLD AUTO: 0.03 K/UL — SIGNIFICANT CHANGE UP (ref 0–0.2)
BASOPHILS NFR BLD AUTO: 0.4 % — SIGNIFICANT CHANGE UP (ref 0–2)
BUN SERPL-MCNC: 20 MG/DL — SIGNIFICANT CHANGE UP (ref 7–23)
CALCIUM SERPL-MCNC: 9.1 MG/DL — SIGNIFICANT CHANGE UP (ref 8.4–10.5)
CHLORIDE SERPL-SCNC: 102 MMOL/L — SIGNIFICANT CHANGE UP (ref 96–108)
CK MB CFR SERPL CALC: 1.7 NG/ML — SIGNIFICANT CHANGE UP (ref 0–6.7)
CK MB CFR SERPL CALC: 1.8 NG/ML — SIGNIFICANT CHANGE UP (ref 0–6.7)
CK SERPL-CCNC: 73 U/L — SIGNIFICANT CHANGE UP (ref 30–200)
CK SERPL-CCNC: 75 U/L — SIGNIFICANT CHANGE UP (ref 30–200)
CO2 SERPL-SCNC: 29 MMOL/L — SIGNIFICANT CHANGE UP (ref 22–31)
CREAT SERPL-MCNC: 1.63 MG/DL — HIGH (ref 0.5–1.3)
EGFR: 44 ML/MIN/1.73M2 — LOW
EOSINOPHIL # BLD AUTO: 0.38 K/UL — SIGNIFICANT CHANGE UP (ref 0–0.5)
EOSINOPHIL NFR BLD AUTO: 4.5 % — SIGNIFICANT CHANGE UP (ref 0–6)
GLUCOSE SERPL-MCNC: 98 MG/DL — SIGNIFICANT CHANGE UP (ref 70–99)
HCT VFR BLD CALC: 36.7 % — LOW (ref 39–50)
HGB BLD-MCNC: 12.7 G/DL — LOW (ref 13–17)
IMM GRANULOCYTES NFR BLD AUTO: 0.1 % — SIGNIFICANT CHANGE UP (ref 0–0.9)
LIDOCAIN IGE QN: 42 U/L — SIGNIFICANT CHANGE UP (ref 7–60)
LYMPHOCYTES # BLD AUTO: 2.25 K/UL — SIGNIFICANT CHANGE UP (ref 1–3.3)
LYMPHOCYTES # BLD AUTO: 26.4 % — SIGNIFICANT CHANGE UP (ref 13–44)
MCHC RBC-ENTMCNC: 32.5 PG — SIGNIFICANT CHANGE UP (ref 27–34)
MCHC RBC-ENTMCNC: 34.6 GM/DL — SIGNIFICANT CHANGE UP (ref 32–36)
MCV RBC AUTO: 93.9 FL — SIGNIFICANT CHANGE UP (ref 80–100)
MONOCYTES # BLD AUTO: 1.05 K/UL — HIGH (ref 0–0.9)
MONOCYTES NFR BLD AUTO: 12.3 % — SIGNIFICANT CHANGE UP (ref 2–14)
NEUTROPHILS # BLD AUTO: 4.81 K/UL — SIGNIFICANT CHANGE UP (ref 1.8–7.4)
NEUTROPHILS NFR BLD AUTO: 56.3 % — SIGNIFICANT CHANGE UP (ref 43–77)
NRBC # BLD: 0 /100 WBCS — SIGNIFICANT CHANGE UP (ref 0–0)
PLATELET # BLD AUTO: 306 K/UL — SIGNIFICANT CHANGE UP (ref 150–400)
POTASSIUM SERPL-MCNC: 4.2 MMOL/L — SIGNIFICANT CHANGE UP (ref 3.5–5.3)
POTASSIUM SERPL-SCNC: 4.2 MMOL/L — SIGNIFICANT CHANGE UP (ref 3.5–5.3)
RBC # BLD: 3.91 M/UL — LOW (ref 4.2–5.8)
RBC # FLD: 13.2 % — SIGNIFICANT CHANGE UP (ref 10.3–14.5)
SODIUM SERPL-SCNC: 138 MMOL/L — SIGNIFICANT CHANGE UP (ref 135–145)
TROPONIN T, HIGH SENSITIVITY RESULT: 10 NG/L — SIGNIFICANT CHANGE UP (ref 0–51)
TROPONIN T, HIGH SENSITIVITY RESULT: 11 NG/L — SIGNIFICANT CHANGE UP (ref 0–51)
WBC # BLD: 8.53 K/UL — SIGNIFICANT CHANGE UP (ref 3.8–10.5)
WBC # FLD AUTO: 8.53 K/UL — SIGNIFICANT CHANGE UP (ref 3.8–10.5)

## 2023-08-30 PROCEDURE — 99285 EMERGENCY DEPT VISIT HI MDM: CPT

## 2023-08-30 PROCEDURE — 71045 X-RAY EXAM CHEST 1 VIEW: CPT | Mod: 26

## 2023-08-30 RX ORDER — CLOPIDOGREL BISULFATE 75 MG/1
75 TABLET, FILM COATED ORAL ONCE
Refills: 0 | Status: COMPLETED | OUTPATIENT
Start: 2023-08-30 | End: 2023-08-30

## 2023-08-30 RX ORDER — ASPIRIN/CALCIUM CARB/MAGNESIUM 324 MG
162 TABLET ORAL ONCE
Refills: 0 | Status: COMPLETED | OUTPATIENT
Start: 2023-08-30 | End: 2023-08-30

## 2023-08-30 RX ORDER — ATORVASTATIN CALCIUM 80 MG/1
40 TABLET, FILM COATED ORAL ONCE
Refills: 0 | Status: COMPLETED | OUTPATIENT
Start: 2023-08-30 | End: 2023-08-30

## 2023-08-30 RX ADMIN — Medication 162 MILLIGRAM(S): at 22:30

## 2023-08-30 RX ADMIN — CLOPIDOGREL BISULFATE 75 MILLIGRAM(S): 75 TABLET, FILM COATED ORAL at 22:28

## 2023-08-30 RX ADMIN — ATORVASTATIN CALCIUM 40 MILLIGRAM(S): 80 TABLET, FILM COATED ORAL at 22:29

## 2023-08-30 NOTE — ED PROVIDER NOTE - PHYSICAL EXAMINATION
Sudden numbness or weakness of the face, arm, or leg, especially on one side of the body. Confusion, trouble speaking or understanding. Trouble seeing in one or both eyes. Trouble walking, dizziness, loss of balance or coordination. Severe headache.
CONSTITUTIONAL: Non-toxic; in no apparent distress  HEAD: Normocephalic; atraumatic  EYES: PERRL; EOM intact   ENMT: External appears normal  NECK: Supple; non-tender  CARD: Normal S1, S2; no murmurs, rubs, or gallops  RESP: Normal chest excursion with respiration; breath sounds clear and equal bilaterally  ABD: Soft, non-distended; non-tender  EXT: Normal ROM in all four extremities; non-tender to palpation, no peripheral edema  SKIN: Warm, dry, no rash  NEURO:  No focal neurological deficiencies.

## 2023-08-30 NOTE — ED PROVIDER NOTE - NS ED ROS FT
CONSTITUTIONAL: No fever, no chills, no fatigue  EYES: No eye redness, no visual changes  ENT: No ear pain, no sore throat  CARDIOVASCULAR: +chest pain, no palpitations  RESPIRATORY: No cough, +SOB  GI: No abdominal pain, no nausea, no vomiting, no constipation, no diarrhea  GENITOURINARY: No dysuria, no frequency, no hematuria  MUSCULOSKELETAL: No back pain, no joint pain, no myalgias  SKIN: No rash, no peripheral edema  NEURO: No headache, no confusion    ALL OTHER SYSTEMS NEGATIVE.

## 2023-08-30 NOTE — ED ADULT TRIAGE NOTE - CHIEF COMPLAINT QUOTE
chest with neck and back pain since yesterday ; with mild labored breathing and cough; denies fever or chills;  covid + 3 weeks ago; h/o cardiac stent

## 2023-08-30 NOTE — H&P ADULT - ASSESSMENT
73M PMHx CAD JUNO x1mRCA presented to [facility] ED on 08/30/23 with complaints of chest pain that radiates to back pain. Patient states that CP is similar to   Of note patient with recent admission in Jan 2023 for CP with +CCTA, patient subsequently underwent diagnostic cardiac cath. In ER /91 HR 64 RR 16 spO2 98%RA.   BUN/Cr 20/1.63 Trop T 10, CK/CKMB negative. EKG CXR  While in ER patient given ASA 162mg POx1 and Plavix 75mg POx1. Patient now admitted to cardiac tele for management of unstable angina.       ASA III          Mallampati III    Patient is a candidate for sedation: yes  Sedation: Moderate    Risks & benefits of procedure and alternative therapy have been explained to the patient including but not limited to: allergic reaction, bleeding w/possible need for blood transfusion, infection, renal and vascular compromise, limb damage, arrhythmia, stroke, vessel dissection/perforation, Myocardial infarction, emergent CABG. Informed consent obtained and in chart.      73M PMHx CAD JUNO x1mRCA presented to [facility] ED on 08/30/23 with complaints of 4/10 diffuse achy chest pain that radiates to neck, back, and L arm that has now improved. in  In ER /91 HR 64 RR 16 spO2 98%RA. BUN/Cr 20/1.63 Trop T 10, CK/CKMB negative. EKG 08/30/23 61 BPM NSR TWI avf, T wav flattening V4-V6  CXR 08/30/23 per PA read clear, negative for infiltrates or pleural effusion While in ER patient given ASA 162mg POx1 and Plavix 75mg POx1. Patient now admitted to cardiac tele for management of unstable angina.         ASA II          Mallampati III    Patient is a candidate for sedation: yes  Sedation: Moderate    Risks & benefits of procedure and alternative therapy have been explained to the patient including but not limited to: allergic reaction, bleeding w/possible need for blood transfusion, infection, renal and vascular compromise, limb damage, arrhythmia, stroke, vessel dissection/perforation, Myocardial infarction, emergent CABG. Informed consent obtained and in chart.      73M PMHx HLD, CAD JUNO x1mRCA, STANISLAW (not on CPA), benign pituitary adenoma  presented to [facility] ED on 08/30/23  4/10 diffuse achy chest pain that radiates to neck, back, and L arm that has now improved. in  In ER /91 HR 64 RR 16 spO2 98%RA. BUN/Cr 20/1.63 Trop T 10, CK/CKMB negative. EKG 08/30/23 61 BPM NSR TWI avf, T wave flattening V4-V6 CXR 08/30/23 per PA read clear, negative for infiltrates or pleural effusion While in ER patient given ASA 162mg POx1 and Plavix 75mg POx1. Patient now admitted to cardiac tele for management of unstable angina.         ASA II          Mallampati III    Patient is a candidate for sedation: yes  Sedation: Moderate    Risks & benefits of procedure and alternative therapy have been explained to the patient including but not limited to: allergic reaction, bleeding w/possible need for blood transfusion, infection, renal and vascular compromise, limb damage, arrhythmia, stroke, vessel dissection/perforation, Myocardial infarction, emergent CABG. Informed consent obtained and in chart.      73M PMHx HLD, CAD JUNO x1mRCA, STANISLAW (not on CPA), benign pituitary adenoma  presented to [facility] ED on 08/30/23  4/10 diffuse achy chest pain that radiates to neck, back, and L arm that has now improved. in  In ER /91 HR 64 RR 16 spO2 98%RA. BUN/Cr 20/1.63 Trop T 10, CK/CKMB negative. EKG 08/30/23 61 BPM NSR TWI III,AVF T wave flattening V4-V6 CXR 08/30/23 per PA read clear, negative for infiltrates or pleural effusion While in ER patient given ASA 162mg POx1 and Plavix 75mg POx1. Patient now admitted to cardiac tele for management of unstable angina.         ASA II          Mallampati III    Patient is a candidate for sedation: yes  Sedation: Moderate    Risks & benefits of procedure and alternative therapy have been explained to the patient including but not limited to: allergic reaction, bleeding w/possible need for blood transfusion, infection, renal and vascular compromise, limb damage, arrhythmia, stroke, vessel dissection/perforation, Myocardial infarction, emergent CABG. Informed consent obtained and in chart.

## 2023-08-30 NOTE — ED ADULT NURSE NOTE - NSFALLUNIVINTERV_ED_ALL_ED
Bed/Stretcher in lowest position, wheels locked, appropriate side rails in place/Call bell, personal items and telephone in reach/Instruct patient to call for assistance before getting out of bed/chair/stretcher/Non-slip footwear applied when patient is off stretcher/[place] to call system/Physically safe environment - no spills, clutter or unnecessary equipment/Purposeful proactive rounding/Room/bathroom lighting operational, light cord in reach

## 2023-08-30 NOTE — H&P ADULT - NSHPLABSRESULTS_GEN_ALL_CORE
12.7   8.53  )-----------( 306      ( 30 Aug 2023 21:51 )             36.7       08-30    138  |  102  |  20  ----------------------------<  98  4.2   |  29  |  1.63<H>    Ca    9.1      30 Aug 2023 21:51  Mg     2.3     08-30            EKG: 12.7   8.53  )-----------( 306      ( 30 Aug 2023 21:51 )             36.7       08-30    138  |  102  |  20  ----------------------------<  98  4.2   |  29  |  1.63<H>    Ca    9.1      30 Aug 2023 21:51  Mg     2.3     08-30            EKG 8/30/23: 81 BPM NSR no acute ischemic changes

## 2023-08-30 NOTE — H&P ADULT - HISTORY OF PRESENT ILLNESS
73M PMHx CAD JUNO x1mRCA presented to [facility] ED on 08/30/23 with complaints of chest pain that radiates to back pain.       ER Course   73M PMHx CAD JUNO x1mRCA presented to [facility] ED on 08/30/23 with complaints of chest pain that radiates to back pain.       ER Course  T 98.4F /91 HR 64 RR 16 spO2 98% RA  WBC 8.53 H/H 12.7/36,7 Plt 306   Na 138 K 4.2 BUN/Cr 20/1.63 Mg 2.3   Trop T 10   EKG 08/30/23  CXR 08/30/23   ED Interventions: ASA 162mg POx1, Plavix 75mg POx1     73M PMHx CAD JUNO x1mRCA presented to [facility] ED on 08/30/23 with complaints of chest pain that radiates to back pain.     Patient denies CP, YOUNG, SOB, palpitations, cough, abdominal pain, N/V/D, melena, BRBPR, LE swelling, fever, chills, or sick contacts.      ER Course  T 98.4F /91 HR 64 RR 16 spO2 98% RA  WBC 8.53 H/H 12.7/36,7 Plt 306   Na 138 K 4.2 BUN/Cr 20/1.63 Mg 2.3   Trop T 10 CK/CKMB 75/1.4   EKG 08/30/23  CXR 08/30/23   ED Interventions: ASA 162mg POx1, Plavix 75mg POx1     73M PMHx HLD, CAD JUNO x1mRCA, STANISLAW (not on CPA), benign pituitary adenoma  presented to [facility] ED on 08/30/23 with complaints of worsening diffuse, "achy" CP that radiates to back, neck, and L arm. Patient states that at worse CP is at 4/10. States that he initially had chest discomfort yesterday that he presumed was GERD as pain subsided on it's own. However CP returned tonight and prompted him to come to ER. Patient states that CP is improving is at 2/10 and otherwise denies  YOUNG, SOB, palpitations, cough, abdominal pain, N/V/D, melena, BRBPR, LE swelling, fever, chills, or sick contacts.    Of note patient with admission in January 2023 with similar presentation, CCTA 1/18/23: In-stent stenosis of the mid RCA stent.  Mild-moderate stenosis of the mid LAD. Mild stenoses of the proximal LAD, proximal to mid LCX, proximal RCA and RPL. The remaining segments are normal.  Patient subsequently underwent cardiac cath 1/19/23  pLAD 30%, pLCX 30%, RCA patent stent, RPDA minimal.     ER Course  T 98.4F /91 HR 64 RR 16 spO2 98% RA  WBC 8.53 H/H 12.7/36,7 Plt 306   Na 138 K 4.2 BUN/Cr 20/1.63 Mg 2.3   Trop T 10 CK/CKMB 75/1.4   EKG 08/30/23 61 BPM NSR TWI avf, T wav flattening V4-V6  CXR 08/30/23 per PA read clear, negative for infiltrates or pleural effusion   ED Interventions: ASA 162mg POx1, Plavix 75mg POx1     73M PMHx HLD, CAD JUNO x1mRCA, STANISLAW (not on CPA), benign pituitary adenoma  presented to [facility] ED on 08/30/23 with complaints of worsening diffuse, "achy" CP that radiates to back, neck, and L arm. Patient states that at worse CP is at 4/10. States that he initially had chest discomfort yesterday that he presumed was GERD as pain subsided on it's own. However CP returned tonight and prompted him to come to ER. Patient states that CP is improving is at 2/10 and otherwise denies  YOUNG, SOB, palpitations, cough, abdominal pain, N/V/D, melena, BRBPR, LE swelling, fever, chills, or sick contacts.    Of note patient with admission in January 2023 with similar presentation, CCTA 1/18/23: In-stent stenosis of the mid RCA stent.  Mild-moderate stenosis of the mid LAD. Mild stenoses of the proximal LAD, proximal to mid LCX, proximal RCA and RPL. The remaining segments are normal.  Patient subsequently underwent cardiac cath 1/19/23  pLAD 30%, pLCX 30%, RCA patent stent, RPDA minimal.     ER Course  T 98.4F /91 HR 64 RR 16 spO2 98% RA  WBC 8.53 H/H 12.7/36,7 Plt 306   Na 138 K 4.2 BUN/Cr 20/1.63 Mg 2.3   Trop T 10 CK/CKMB 75/1.4   EKG 08/30/23 61 BPM NSR TWI III, AVF, T wave flattening V4-V6  CXR 08/30/23 per PA read clear, negative for infiltrates or pleural effusion   ED Interventions: ASA 162mg POx1, Plavix 75mg POx1

## 2023-08-30 NOTE — ED PROVIDER NOTE - OBJECTIVE STATEMENT
72 yo M w PMH of CAD s/p RCA stent 2022, p/w chest pain since today. Pain started at rest, radiates to the left neck and left arm, intermittently to back. Associated with mild SOB. Pain is nonexertional, nonpleuritic. No leg swelling, cough, hemoptysis, exogenous estrogen, recent travel, surgery, malignancy, personal or FHx of VTE.

## 2023-08-30 NOTE — H&P ADULT - PROBLEM SELECTOR PLAN 1
Presented to [facility] ED on 08/30/23 with CP  - EKG 08/30/23   - CXR 08/30/23 per PA read   - Trop T 10, f/u repeat CE and EKG   - NPO for possible CCTA vs cath in AM Presented to [facility] ED on 08/30/23 with CP  - EKG 08/30/23   - CXR 08/30/23 per PA read   - Trop T 10, f/u repeat CE and EKG   - Patient consented.   - NPO for possible CCTA vs cath in AM        ***Previous Cardiac Studies****   -[facility] cath 01/19/23: pLAD 30%, pLCx 30%, RCA patent stent, RPDA minimal. EDP 6  - CCTA 1/18/23: In-stent stenosis of the mid RCA stent.  Mild-moderate stenosis of the mid LAD. Mild stenoses of the proximal LAD, proximal to mid LCX, proximal RCA and RPL. The remaining segments are normal.  - TTE 01/19/23: EF 65-70%, normal biventricular size and systolic function, Mild symmetric LVH, No significant valvular disease, PASP 27 mmHg, no pericardial effusion. Presented to [facility] ED on 08/30/23 with 4/10 diffuse achy CP that is improving. VSS   - EKG 08/30/23 61 BPM NSR TWI in AVF, T wave flattening V4-V6  - CXR 08/30/23 per PA read negative for infiltrates or pleural effusion   - Trop T 10 -> 11, CK/CKMB negative   - S/P ASA 162mg POx1, Plavix 75m POx1, Nitro 0.4mg SL x1  - Patient consented for cath, in chart.    - NPO for possible CCTA vs cath in AM  - F/u lipid panel, A1C, and TSH in AM  - Of note patient w/ similar presented in Jan 2023 had +CCTA for ISR of mRCA and underwent cardiac cath that revealed patent RCA stent and non-obstructive dz     ***Previous Cardiac Studies****   -[facility] cath 01/19/23: pLAD 30%, pLCx 30%, RCA patent stent, RPDA minimal. EDP 6  - CCTA 1/18/23: In-stent stenosis of the mid RCA stent.  Mild-moderate stenosis of the mid LAD. Mild stenoses of the proximal LAD, proximal to mid LCX, proximal RCA and RPL. The remaining segments are normal.  - TTE 01/19/23: EF 65-70%, normal biventricular size and systolic function, Mild symmetric LVH, No significant valvular disease, PASP 27 mmHg, no pericardial effusion. Presented to [facility] ED on 08/30/23 with 4/10 diffuse achy CP that initially subsided however has returned.  VSS SBP 130s, HR 60s, satting 95% on RA   - EKG 08/30/23 61 BPM NSR TWI in AVF, T wave flattening V4-V6.  CXR 08/30/23 per PA read negative for infiltrates or pleural effusion   - Trop T 10 -> 11, CK/CKMB negative   - S/P ASA 162mg POx1, Plavix 75m POx1, Nitro 0.4mg SL x1  - Patient consented for cath, in chart.  NPO for possible CCTA vs cath in AM  - F/u lipid panel, A1C, and TSH in AM  - Of note patient w/ similar presented in Jan 2023 had +CCTA for ISR of mRCA and underwent cardiac cath that revealed patent RCA stent and non-obstructive dz     ***Previous Cardiac Studies****   [facility] pLAD 30%, pLCx 30%, RCA patent stent, RPDA minimal. EDP 6  - CCTA 1/18/23: In-stent stenosis of the mid RCA stent.  Mild-moderate stenosis of the mid LAD. Mild stenoses of the proximal LAD, proximal to mid LCX, proximal RCA and RPL. The remaining segments are normal.  - TTE 01/19/23: EF 65-70%, normal biventricular size and systolic function, Mild symmetric LVH, No significant valvular disease, PASP 27 mmHg, no pericardial effusion. Presented to [facility] ED on 08/30/23 with 4/10 diffuse achy CP that initially subsided however has returned.  VSS SBP 130s, HR 60s, satting 95% on RA   - EKG 08/30/23 61 BPM NSR TWI in III, AVF, T wave flattening V4-V6.  CXR 08/30/23 per PA read negative for infiltrates or pleural effusion   - Trop T 10 -> 11, CK/CKMB negative   - S/P ASA 162mg POx1, Plavix 75m POx1, Nitro 0.4mg SL x1  - Patient consented for cath, in chart.  NPO for possible CCTA vs cath in AM  - F/u lipid panel, A1C, and TSH in AM  - Of note patient w/ similar presented in Jan 2023 had +CCTA for ISR of mRCA and underwent cardiac cath that revealed patent RCA stent and non-obstructive dz     ***Previous Cardiac Studies****   -[facility] cath 01/19/23: pLAD 30%, pLCx 30%, RCA patent stent, RPDA minimal. EDP 6  - CCTA 1/18/23: In-stent stenosis of the mid RCA stent.  Mild-moderate stenosis of the mid LAD. Mild stenoses of the proximal LAD, proximal to mid LCX, proximal RCA and RPL. The remaining segments are normal.  - TTE 01/19/23: EF 65-70%, normal biventricular size and systolic function, Mild symmetric LVH, No significant valvular disease, PASP 27 mmHg, no pericardial effusion.

## 2023-08-30 NOTE — H&P ADULT - PROBLEM SELECTOR PLAN 2
SBP 160s on arrival   - On admission BUN/Cr 20/1.63  - per review patient's baseline Cr 0.8-0.9   - will hydrate with 75cc NS x6 hours as patient will likely need CCTA vs cath in AM On admission BUN/Cr 20/1.63  - per review patient's baseline Cr 0.8-0.9   - will hydrate with 75cc NS x6 hours as patient will likely need CCTA vs cath in AM  - avoid nephrotoxic drugs, monitor Cr levels

## 2023-08-30 NOTE — ED ADULT NURSE NOTE - OBJECTIVE STATEMENT
no
chest with neck and back pain since yesterday ; with mild labored breathing and cough; denies fever or chills;  covid + 3 weeks ago; h/o cardiac stent    Pt A&Ox4 and able to speak in complete sentences. Pt breathing even and unlabored with equal chest rise and fall. pt arrived d/t chest discomfort since yesterday. pt denies radiation of cp and endorsed central chest. pt pmh stent and GERD. pt denies sob, n, v, lightheadedness, dizziness, fevers, chills. saturation 96% RA. Pt recently had covid 3 weeks ago. pt on plavix.

## 2023-08-30 NOTE — H&P ADULT - NSICDXPASTMEDICALHX_GEN_ALL_CORE_FT
PAST MEDICAL HISTORY:  CAD (coronary artery disease) s/p Cardiac Cath 6/23/22 JUNO mRCA    Hyperlipidemia      PAST MEDICAL HISTORY:  CAD (coronary artery disease) s/p Cardiac Cath 6/23/22 JUNO mRCA    Hyperlipidemia     Obstructive sleep apnea     Pituitary adenoma

## 2023-08-30 NOTE — H&P ADULT - PROBLEM SELECTOR PLAN 3
- C/w home med Atorva 40mg qhs   - f/u lipid panel in AM         F: precath fluids   E: Replete for K <4, Mg <2   N: NPO at MN (DASH diet)   DVT PPx: holding 2/2 possible cath  Code Status   Emergency Contact - C/w home med Atorva 40mg qhs   - f/u lipid panel in AM         F: NS   E: Replete for K <4, Mg <2   N: NPO at MN (DASH diet)   GI PPx: home Protonix   DVT PPx: holding 2/2 possible cath  Full Code   Emergency Contact   Gita Welch [phone] - C/w home med Atorva 40mg qhs   - f/u lipid panel in AM         F: NS 75cc   E: Replete for K <4, Mg <2   N: NPO at MN (DASH diet)   GI PPx: home Protonix   DVT PPx: holding 2/2 possible cath  Full Code   Emergency Contact   Gita Welch [phone] - C/w home med Atorva 40mg qhs   - f/u lipid panel in AM         F: NS 75cc   E: Replete for K <4, Mg <2   N: NPO at MN (DASH diet)   GI PPx: home Protonix   DVT PPx: holding 2/2 possible cath  Dispo: Pending medically stability likely home    Full Code   Emergency Contact   Gita Welch [phone]

## 2023-08-30 NOTE — ED PROVIDER NOTE - CLINICAL SUMMARY MEDICAL DECISION MAKING FREE TEXT BOX
Unstable angina, concerned for ACS, given concerning history and physical and increased risk factors. ECG shows inversion in AvF. Trop neg x1. There is no evidence of volume overload or shock on exam. Low suspicion for acute PE (Wells low risk), PTX, aortic dissection, cardiac effusion/tamponade.    Plan: Cardiac monitor, EKG, trop, CXR, ASA, plavix, admit for unstable angina

## 2023-08-30 NOTE — H&P ADULT - NS ATTEND AMEND GEN_ALL_CORE FT
73M PMHx HLD, CAD JUNO x1mRCA, STANISLAW (not on CPA), benign pituitary adenoma  presented to [facility] ED on 08/30/23 with complaints of worsening diffuse, "achy" CP that radiates to back, neck, and L arm. Patient states that at worse CP is at 4/10.    1. Unstable angina  S.p C in 1/2023. Films reviewed: ? microcirculatory disease vs pericarditis given recent covid. Echo to evaluate pericardial effusion, if normal, discuss repeat [facility] as an inpatient vs PET-CT as outpatient for evaluation of microcirculatory disease. add imdur and nifedipine.

## 2023-08-31 DIAGNOSIS — N17.9 ACUTE KIDNEY FAILURE, UNSPECIFIED: ICD-10-CM

## 2023-08-31 LAB
A1C WITH ESTIMATED AVERAGE GLUCOSE RESULT: 5.7 % — HIGH (ref 4–5.6)
ALBUMIN SERPL ELPH-MCNC: 3.7 G/DL — SIGNIFICANT CHANGE UP (ref 3.3–5)
ALP SERPL-CCNC: 50 U/L — SIGNIFICANT CHANGE UP (ref 40–120)
ALT FLD-CCNC: 21 U/L — SIGNIFICANT CHANGE UP (ref 10–45)
ANION GAP SERPL CALC-SCNC: 5 MMOL/L — SIGNIFICANT CHANGE UP (ref 5–17)
AST SERPL-CCNC: 16 U/L — SIGNIFICANT CHANGE UP (ref 10–40)
BILIRUB SERPL-MCNC: 0.6 MG/DL — SIGNIFICANT CHANGE UP (ref 0.2–1.2)
BUN SERPL-MCNC: 16 MG/DL — SIGNIFICANT CHANGE UP (ref 7–23)
CALCIUM SERPL-MCNC: 9.3 MG/DL — SIGNIFICANT CHANGE UP (ref 8.4–10.5)
CHLORIDE SERPL-SCNC: 106 MMOL/L — SIGNIFICANT CHANGE UP (ref 96–108)
CHOLEST SERPL-MCNC: 94 MG/DL — SIGNIFICANT CHANGE UP
CK MB CFR SERPL CALC: 1.8 NG/ML — SIGNIFICANT CHANGE UP (ref 0–6.7)
CK SERPL-CCNC: 71 U/L — SIGNIFICANT CHANGE UP (ref 30–200)
CO2 SERPL-SCNC: 30 MMOL/L — SIGNIFICANT CHANGE UP (ref 22–31)
CREAT SERPL-MCNC: 1.1 MG/DL — SIGNIFICANT CHANGE UP (ref 0.5–1.3)
EGFR: 71 ML/MIN/1.73M2 — SIGNIFICANT CHANGE UP
ESTIMATED AVERAGE GLUCOSE: 117 MG/DL — HIGH (ref 68–114)
GLUCOSE SERPL-MCNC: 95 MG/DL — SIGNIFICANT CHANGE UP (ref 70–99)
HCT VFR BLD CALC: 36.8 % — LOW (ref 39–50)
HCV AB S/CO SERPL IA: 0.05 S/CO — SIGNIFICANT CHANGE UP
HCV AB SERPL-IMP: SIGNIFICANT CHANGE UP
HDLC SERPL-MCNC: 24 MG/DL — LOW
HGB BLD-MCNC: 12.5 G/DL — LOW (ref 13–17)
LIPID PNL WITH DIRECT LDL SERPL: 52 MG/DL — SIGNIFICANT CHANGE UP
MAGNESIUM SERPL-MCNC: 2.2 MG/DL — SIGNIFICANT CHANGE UP (ref 1.6–2.6)
MCHC RBC-ENTMCNC: 32.2 PG — SIGNIFICANT CHANGE UP (ref 27–34)
MCHC RBC-ENTMCNC: 34 GM/DL — SIGNIFICANT CHANGE UP (ref 32–36)
MCV RBC AUTO: 94.8 FL — SIGNIFICANT CHANGE UP (ref 80–100)
NON HDL CHOLESTEROL: 70 MG/DL — SIGNIFICANT CHANGE UP
NRBC # BLD: 0 /100 WBCS — SIGNIFICANT CHANGE UP (ref 0–0)
PLATELET # BLD AUTO: 272 K/UL — SIGNIFICANT CHANGE UP (ref 150–400)
POTASSIUM SERPL-MCNC: 4.8 MMOL/L — SIGNIFICANT CHANGE UP (ref 3.5–5.3)
POTASSIUM SERPL-SCNC: 4.8 MMOL/L — SIGNIFICANT CHANGE UP (ref 3.5–5.3)
PROT SERPL-MCNC: 6.7 G/DL — SIGNIFICANT CHANGE UP (ref 6–8.3)
RBC # BLD: 3.88 M/UL — LOW (ref 4.2–5.8)
RBC # FLD: 13.3 % — SIGNIFICANT CHANGE UP (ref 10.3–14.5)
SODIUM SERPL-SCNC: 141 MMOL/L — SIGNIFICANT CHANGE UP (ref 135–145)
TRIGL SERPL-MCNC: 88 MG/DL — SIGNIFICANT CHANGE UP
TROPONIN T, HIGH SENSITIVITY RESULT: 11 NG/L — SIGNIFICANT CHANGE UP (ref 0–51)
TSH SERPL-MCNC: 1.04 UIU/ML — SIGNIFICANT CHANGE UP (ref 0.27–4.2)
WBC # BLD: 7.12 K/UL — SIGNIFICANT CHANGE UP (ref 3.8–10.5)
WBC # FLD AUTO: 7.12 K/UL — SIGNIFICANT CHANGE UP (ref 3.8–10.5)

## 2023-08-31 PROCEDURE — 93010 ELECTROCARDIOGRAM REPORT: CPT

## 2023-08-31 PROCEDURE — 93458 L HRT ARTERY/VENTRICLE ANGIO: CPT | Mod: 26

## 2023-08-31 PROCEDURE — 93571 IV DOP VEL&/PRESS C FLO 1ST: CPT | Mod: 26,LD

## 2023-08-31 PROCEDURE — 93306 TTE W/DOPPLER COMPLETE: CPT | Mod: 26

## 2023-08-31 PROCEDURE — 99223 1ST HOSP IP/OBS HIGH 75: CPT

## 2023-08-31 RX ORDER — NITROGLYCERIN 6.5 MG
0.4 CAPSULE, EXTENDED RELEASE ORAL ONCE
Refills: 0 | Status: COMPLETED | OUTPATIENT
Start: 2023-08-31 | End: 2023-08-31

## 2023-08-31 RX ORDER — SODIUM CHLORIDE 9 MG/ML
500 INJECTION INTRAMUSCULAR; INTRAVENOUS; SUBCUTANEOUS
Refills: 0 | Status: DISCONTINUED | OUTPATIENT
Start: 2023-08-31 | End: 2023-08-31

## 2023-08-31 RX ORDER — NIFEDIPINE 30 MG
30 TABLET, EXTENDED RELEASE 24 HR ORAL DAILY
Refills: 0 | Status: DISCONTINUED | OUTPATIENT
Start: 2023-08-31 | End: 2023-08-31

## 2023-08-31 RX ORDER — SODIUM CHLORIDE 9 MG/ML
500 INJECTION INTRAMUSCULAR; INTRAVENOUS; SUBCUTANEOUS
Refills: 0 | Status: DISCONTINUED | OUTPATIENT
Start: 2023-08-31 | End: 2023-09-01

## 2023-08-31 RX ORDER — ATORVASTATIN CALCIUM 80 MG/1
40 TABLET, FILM COATED ORAL AT BEDTIME
Refills: 0 | Status: DISCONTINUED | OUTPATIENT
Start: 2023-08-31 | End: 2023-09-01

## 2023-08-31 RX ORDER — ISOSORBIDE MONONITRATE 60 MG/1
30 TABLET, EXTENDED RELEASE ORAL DAILY
Refills: 0 | Status: DISCONTINUED | OUTPATIENT
Start: 2023-08-31 | End: 2023-08-31

## 2023-08-31 RX ORDER — CLOPIDOGREL BISULFATE 75 MG/1
75 TABLET, FILM COATED ORAL DAILY
Refills: 0 | Status: DISCONTINUED | OUTPATIENT
Start: 2023-08-31 | End: 2023-09-01

## 2023-08-31 RX ORDER — PANTOPRAZOLE SODIUM 20 MG/1
40 TABLET, DELAYED RELEASE ORAL
Refills: 0 | Status: DISCONTINUED | OUTPATIENT
Start: 2023-08-31 | End: 2023-09-01

## 2023-08-31 RX ORDER — ASPIRIN/CALCIUM CARB/MAGNESIUM 324 MG
81 TABLET ORAL DAILY
Refills: 0 | Status: DISCONTINUED | OUTPATIENT
Start: 2023-08-31 | End: 2023-09-01

## 2023-08-31 RX ORDER — SODIUM CHLORIDE 9 MG/ML
250 INJECTION INTRAMUSCULAR; INTRAVENOUS; SUBCUTANEOUS ONCE
Refills: 0 | Status: COMPLETED | OUTPATIENT
Start: 2023-08-31 | End: 2023-08-31

## 2023-08-31 RX ADMIN — SODIUM CHLORIDE 75 MILLILITER(S): 9 INJECTION INTRAMUSCULAR; INTRAVENOUS; SUBCUTANEOUS at 15:24

## 2023-08-31 RX ADMIN — SODIUM CHLORIDE 500 MILLILITER(S): 9 INJECTION INTRAMUSCULAR; INTRAVENOUS; SUBCUTANEOUS at 15:24

## 2023-08-31 RX ADMIN — Medication 0.4 MILLIGRAM(S): at 00:36

## 2023-08-31 RX ADMIN — ATORVASTATIN CALCIUM 40 MILLIGRAM(S): 80 TABLET, FILM COATED ORAL at 22:22

## 2023-08-31 RX ADMIN — PANTOPRAZOLE SODIUM 40 MILLIGRAM(S): 20 TABLET, DELAYED RELEASE ORAL at 05:29

## 2023-08-31 RX ADMIN — SODIUM CHLORIDE 200 MILLILITER(S): 9 INJECTION INTRAMUSCULAR; INTRAVENOUS; SUBCUTANEOUS at 17:51

## 2023-08-31 RX ADMIN — CLOPIDOGREL BISULFATE 75 MILLIGRAM(S): 75 TABLET, FILM COATED ORAL at 12:20

## 2023-08-31 RX ADMIN — Medication 30 MILLILITER(S): at 01:07

## 2023-08-31 RX ADMIN — SODIUM CHLORIDE 75 MILLILITER(S): 9 INJECTION INTRAMUSCULAR; INTRAVENOUS; SUBCUTANEOUS at 00:36

## 2023-08-31 RX ADMIN — Medication 81 MILLIGRAM(S): at 12:22

## 2023-08-31 NOTE — PROGRESS NOTE ADULT - PROBLEM SELECTOR PLAN 3
- C/w home med Atorva 40mg qhs   - f/u lipid panel in AM         F: NS 75cc   E: Replete for K <4, Mg <2   N: NPO at MN (DASH diet)   GI PPx: home Protonix   DVT PPx: holding 2/2 possible cath  Dispo: Pending medically stability likely home    Full Code   Emergency Contact   Gita eWlch [phone] - C/w home med Atorvastatin 40mg qd   - Lipid panel WNL, LDL 52    F: pre-cath IVF   E: Replete for K <4, Mg <2   N: NPO for cath (DASH diet)   GI PPx: home PPI  FULL CODE  DVT PPx: holding 2/2 cath  Dispo: Pending cath

## 2023-08-31 NOTE — DISCHARGE NOTE PROVIDER - HOSPITAL COURSE
**incomplete**  73M PMHx HLD, CAD JUNO x1mRCA, STANISLAW (not on CPA), benign pituitary adenoma  presented to [facility] ED on 08/30/23  4/10 diffuse achy chest pain that radiates to neck, back, and L arm that has now improved. in  In ER /91 HR 64 RR 16 spO2 98%RA. BUN/Cr 20/1.63 Trop T 10, CK/CKMB negative. EKG 08/30/23 61 BPM NSR TWI avf, T wave flattening V4-V6 CXR 08/30/23 per PA read clear, negative for infiltrates or pleural effusion While in ER patient given ASA 162mg POx1 and Plavix 75mg POx1. Patient now admitted to cardiac tele for management of unstable angina.     CCTA 08/31/23 73M PMHx HLD, CAD JUNO x1mRCA, STANISLAW (not on CPA), benign pituitary adenoma  presented to [facility] ED on 08/30/23  4/10 diffuse achy chest pain that radiates to neck, back, and L arm that has now improved. in  In ER /91 HR 64 RR 16 spO2 98%RA. BUN/Cr 20/1.63 Trop T 10, CK/CKMB negative. EKG 08/30/23 61 BPM NSR TWI avf, T wave flattening V4-V6 CXR 08/30/23 per PA read clear, negative for infiltrates or pleural effusion While in ER patient given ASA 162mg POx1 and Plavix 75mg POx1. Patient now admitted to cardiac tele for management of unstable angina.      8/31/2023 with patent RCA stent, LM: MLI, LAD 20-30, LCX 40% distal stenosis. core flow showing CFR 5.6, ffr .89, and IMR 18 consistent with no microvascular disease, EDP 14   Access: RTR 7:30   73M PMHx HLD, CAD JUNO x1mRCA, STANISLAW (not on CPA), benign pituitary adenoma  presented to [facility] ED on 08/30/23  4/10 diffuse achy chest pain that radiates to neck, back, and L arm that has now improved. In ER /91 HR 64 RR 16 spO2 98%RA. BUN/Cr 20/1.63 Trop T neg x 3, CK/CKMB negative. EKG 08/30/23 61 BPM NSR TWI III, aVF. CXR 08/30/23 negative for infiltrates or pleural effusion. While in ER patient given ASA 162mg POx1 and Plavix 75mg POx1. Patient now admitted to cardiac tele for management of unstable angina. Pt underwent cardiac cath w/ microvascular study 8/31/2023 w/ Dr Marina: patent RCA stent, LM: MLI, LAD 20-30, LCX 40% distal stenosis. core flow showing CFR 5.6, FFR 0.89, and IMR 18 consistent with no microvascular disease, EDP 14. Access R radial site ___.    73M PMHx HLD, CAD JUNO x1mRCA, STANISLAW (not on CPAP), benign pituitary adenoma, presented to [facility] ED on 08/30/23 c/o 4/10 diffuse achy chest pain that radiates to neck, back, and L arm that has now improved. BUN/Cr 20/1.63, hs Trop T neg x 3, CK/CKMB negative. EKG NSR 61 BPM, new TWi leads III, aVF. CXR clear. Pt admitted to cardiac tele and ruled out for ACS. Pt underwent cardiac cath w/ microvascular study 8/31/2023 w/ Dr Marina: patent RCA stent, LM: MLI, LAD 20-30, LCX 40% distal stenosis. core flow showing CFR 5.6, FFR 0.89, and IMR 18 consistent with no microvascular disease, EDP 14. Access R radial site CDI w/o hematoma. Chest pain improved w/ Maalox. Of note, pt w/ Hx hiatal hernia noted on CCTA 2022, will f/u outpatient GI.    On the day of discharge, the patient was seen and examined. Symptoms improved. Vital signs are stable. Labs and imaging reviewed. Patient is medically optimized and hemodynamically stable. Return precautions discussed, medication teach back done, and importance of physician followup emphasized. The patient verbalized understanding. 73M PMHx HLD, CAD JUNO x1mRCA, STANISLAW (not on CPAP), benign pituitary adenoma, presented to [facility] ED on 08/30/23 c/o 4/10 diffuse achy chest pain that radiates to neck, back, and L arm that has now improved. BUN/Cr 20/1.63, hs Trop T neg x 3, CK/CKMB negative. EKG NSR 61 BPM, new TWi leads III, aVF. CXR clear. Pt admitted to cardiac tele and ruled out for ACS. Pt underwent cardiac cath w/ microvascular study 8/31/2023 w/ Dr Marina: patent RCA stent, LM: MLI, LAD 20-30, LCX 40% distal stenosis. core flow showing CFR 5.6, FFR 0.89, and IMR 18 consistent with no microvascular disease, EDP 14. Access R radial site CDI w/o hematoma. Chest pain improved w/ Maalox. Of note, pt w/ Hx small type 1 hiatal hernia noted on CCTA 1/2023, will f/u outpatient GI.    On the day of discharge, the patient was seen and examined. Symptoms improved. Vital signs are stable. Labs and imaging reviewed. Patient is medically optimized and hemodynamically stable. Return precautions discussed, medication teach back done, and importance of physician followup emphasized. The patient verbalized understanding.

## 2023-08-31 NOTE — DISCHARGE NOTE PROVIDER - NSDCCPCAREPLAN_GEN_ALL_CORE_FT
PRINCIPAL DISCHARGE DIAGNOSIS  Diagnosis: Unstable angina  Assessment and Plan of Treatment: - You came to the ER with chest pain. Your cardaic eznymes were negative x2.   - You have a diagnosis of coronary artery disease and underwent a CCTA/cath. You received a stent to your coronary artery.    - Please continue taking your ASA 81mg and Plavix 75 mg every day.  -  The procedure was done through the wrist/groin.   - Please avoid any heavy lifting  (no more than 3 to 5 lbs) or strenuous activity for five days. If you develop any swelling, bleeding, hardening of the skin (hematoma formation), acute pain, numbness/tingling  in your arm or leg please contact your doctor immediately or call our 24/7 line: [phone].   - NEVER MISS A DOSE OF ASPIRIN OR PLAVIX; IF YOU DO, YOU ARE AT RISK OF YOUR STENT CLOSING AND HAVING A HEART ATTACK. DO NOT STOP THESE TWO MEDICATIONS UNLESS INSTRUCTED TO DO SO BY YOUR CARDIOLOGIST.    - Please make a follow up appointment with your cardiologist within 1-2 weeks of your discharge. All of your prescriptions have been sent electronically to your pharmacy.        SECONDARY DISCHARGE DIAGNOSES  Diagnosis: HLD (hyperlipidemia)  Assessment and Plan of Treatment: - You have a history of "high cholesterol"   - Your cholesterol panel is abnormal. Your LDL is (INSERT) mg/dL and your goal LDL is less than 70 mg/dL. LDL is also known as "bad cholesterol" because it takes cholesterol to your arteries, where it may collect in artery walls. Too much cholesterol in your arteries may lead to a buildup of plaque known as atherosclerosis and can cause heart disease. Your HDL is (INSERT) mg/dL and your goal HDL is greater than 50 mg/dL. The higher the HDL the better; HDL is known as “good cholesterol” because it transports cholesterol to your liver to be expelled from your body.       PRINCIPAL DISCHARGE DIAGNOSIS  Diagnosis: Chest pain  Assessment and Plan of Treatment:      PRINCIPAL DISCHARGE DIAGNOSIS  Diagnosis: Chest pain  Assessment and Plan of Treatment: You came into the hospital for chest pain and underwent cardiac evaluation. You had a cardiac catheterization that showed your previous cardiac stent in the Right Coronary Artery remains patent or open, and have mild plaque buildup in the other 2 coronary arteries that should be medically managed. You did not have microvascular disease. Continue taking Aspirin, Plavix, and Atorvastatin. You had an echocardiogram that was unremarkable. Follow up with your PMD/Cardiologist Dr Smith. Follow up with Gastroenterologist for a small type 1 hiatal hernia that was noted on CT Scan previously.

## 2023-08-31 NOTE — PROGRESS NOTE ADULT - PROBLEM SELECTOR PLAN 1
Presented to [facility] ED on 08/30/23 with 4/10 diffuse achy CP that initially subsided however has returned.  VSS SBP 130s, HR 60s, satting 95% on RA   - EKG 08/30/23 61 BPM NSR TWI in III, AVF, T wave flattening V4-V6.  CXR 08/30/23 per PA read negative for infiltrates or pleural effusion   - Trop T 10 -> 11, CK/CKMB negative   - S/P ASA 162mg POx1, Plavix 75m POx1, Nitro 0.4mg SL x1  - Patient consented for cath, in chart.  NPO for possible CCTA vs cath in AM  - F/u lipid panel, A1C, and TSH in AM  - Of note patient w/ similar presented in Jan 2023 had +CCTA for ISR of mRCA and underwent cardiac cath that revealed patent RCA stent and non-obstructive dz     ***Previous Cardiac Studies****   -[facility] cath 01/19/23: pLAD 30%, pLCx 30%, RCA patent stent, RPDA minimal. EDP 6  - CCTA 1/18/23: In-stent stenosis of the mid RCA stent.  Mild-moderate stenosis of the mid LAD. Mild stenoses of the proximal LAD, proximal to mid LCX, proximal RCA and RPL. The remaining segments are normal.  - TTE 01/19/23: EF 65-70%, normal biventricular size and systolic function, Mild symmetric LVH, No significant valvular disease, PASP 27 mmHg, no pericardial effusion. Presented to [facility] ED on 08/30/23 with 4/10 diffuse midsternal CP, described as constant pressure, that initially subsided however has returned.  VSS SBP 130s, HR 60s, satting 95% on RA   - EKG w/ NSR 60s, new TWI in III, AVF.    - CXR 08/30/23 clear; negative for infiltrates or pleural effusion   - Trop T negative x2 10 -> 11, CK/CKMB negative   - S/P ASA 162mg POx1, Plavix 75m POx1, Nitro 0.4mg SL x1 in ED  - c/w ASA 81mg qd, Plavix 75mg qd (reports compliance w/ DAPT)  - Lipid panel WNL, LDL 52  - Of note patient w/ similar presented in Jan 2023 had +CCTA for ISR of mRCA and underwent cardiac cath that revealed patent RCA stent and non-obstructive dz    -[facility] cath 01/19/23: pLAD 30%, pLCx 30%, RCA patent stent, RPDA minimal. EDP 6  - CCTA 1/18/23: In-stent stenosis of the mid RCA stent.  Mild-moderate stenosis of the mid LAD. Mild stenoses of the proximal LAD, proximal to mid LCX, proximal RCA and RPL. The remaining segments are normal.  - TTE 01/19/23: EF 65-70%, normal biventricular size and systolic function, Mild symmetric LVH, No significant valvular disease, PASP 27 mmHg, no pericardial effusion.  - F/u repeat Echo, also assess for pericardial effusion given recent COVID  - Pending cardiac cath w/ microvascular study 8/31 w/ Dr Marina. Consent in chart

## 2023-08-31 NOTE — PROGRESS NOTE ADULT - ASSESSMENT
73M PMHx HLD, CAD JUNO x1mRCA, STANISLAW (not on CPA), benign pituitary adenoma  presented to [facility] ED on 08/30/23  4/10 diffuse achy chest pain that radiates to neck, back, and L arm that has now improved. in  In ER /91 HR 64 RR 16 spO2 98%RA. BUN/Cr 20/1.63 Trop T 10, CK/CKMB negative. EKG 08/30/23 61 BPM NSR TWI III,AVF T wave flattening V4-V6 CXR 08/30/23 per PA read clear, negative for infiltrates or pleural effusion While in ER patient given ASA 162mg POx1 and Plavix 75mg POx1. Patient now admitted to cardiac tele for management of unstable angina.         ASA II          Mallampati III    Patient is a candidate for sedation: yes  Sedation: Moderate    Risks & benefits of procedure and alternative therapy have been explained to the patient including but not limited to: allergic reaction, bleeding w/possible need for blood transfusion, infection, renal and vascular compromise, limb damage, arrhythmia, stroke, vessel dissection/perforation, Myocardial infarction, emergent CABG. Informed consent obtained and in chart.      73M w/ PMHx HLD, CAD JUNO x1 mRCA, STANISLAW (not on CPAP), benign pituitary adenoma, presented to [facility] on 08/30/23 c/o 4/10 constant midsternal chest pain that radiates to neck, back, and L arm occuring at rest. Trop T negative x2. EKG w/ new TWI III, AVF. Admitted to cardiac tele for management of unstable angina. Pending cardiac cath today w/ Dr Marina.    ASA II          Mallampati III    Patient is a candidate for sedation: yes  Sedation: Moderate    Risks & benefits of procedure and alternative therapy have been explained to the patient including but not limited to: allergic reaction, bleeding w/possible need for blood transfusion, infection, renal and vascular compromise, limb damage, arrhythmia, stroke, vessel dissection/perforation, Myocardial infarction, emergent CABG. Informed consent obtained and in chart.    73M w/ PMHx HLD, CAD JUNO x1 mRCA, STANISLAW (not on CPAP), benign pituitary adenoma, recent COVID-19 infx 3 wks ago, presented to [facility] on 08/30/23 c/o 4/10 constant midsternal chest pain that radiates to neck, back, and L arm occuring at rest. Trop T negative x2. EKG w/ new TWI III, AVF. Admitted to cardiac tele for management of unstable angina. Pending cardiac cath today w/ Dr Marina.    ASA II          Mallampati III    Patient is a candidate for sedation: yes  Sedation: Moderate    Risks & benefits of procedure and alternative therapy have been explained to the patient including but not limited to: allergic reaction, bleeding w/possible need for blood transfusion, infection, renal and vascular compromise, limb damage, arrhythmia, stroke, vessel dissection/perforation, Myocardial infarction, emergent CABG. Informed consent obtained and in chart.

## 2023-08-31 NOTE — DISCHARGE NOTE PROVIDER - ATTENDING DISCHARGE PHYSICAL EXAMINATION:
73M PMHx HLD, CAD JUNO x1mRCA, STANISLAW (not on CPAP), benign pituitary adenoma, admitted with unstable angina.    1. CAD/Unstable angina  Hx of unstable angina s.p LHC with PCI, and with LHC without obstruction. Presented with another episode of UA. LHC negative for coronary obstruction, microcirculatory studies performed ---- negative.   Discharge home on aspirin, plavix and atorvastatin.

## 2023-08-31 NOTE — PROGRESS NOTE ADULT - SUBJECTIVE AND OBJECTIVE BOX
CARDIOLOGY NP PROGRESS NOTE    Subjective:   Remainder ROS otherwise negative.    Overnight Events:     TELEMETRY:        VITAL SIGNS:  T(C): 36.5 (08-31-23 @ 11:24), Max: 36.9 (08-30-23 @ 20:59)  HR: 51 (08-31-23 @ 11:24) (51 - 64)  BP: 133/78 (08-31-23 @ 11:24) (131/79 - 167/91)  RR: 18 (08-31-23 @ 11:24) (16 - 18)  SpO2: 95% (08-31-23 @ 11:24) (95% - 98%)  Wt(kg): --    I&O's Summary    30 Aug 2023 07:01  -  31 Aug 2023 07:00  --------------------------------------------------------  IN: 0 mL / OUT: 450 mL / NET: -450 mL    31 Aug 2023 07:01  -  31 Aug 2023 13:44  --------------------------------------------------------  IN: 0 mL / OUT: 750 mL / NET: -750 mL          PHYSICAL EXAM:    General: A/ox 3, No acute Distress  Neck: Supple, NO JVD  Cardiac: S1 S2, No M/R/G  Pulmonary: CTAB, Breathing unlabored, No Rhonchi/Rales/Wheezing  Abdomen: Soft, Non -tender, +BS x 4 quads  Extremities: No Rashes, No edema  Neuro: A/o x 3, No focal deficits          LABS:                          12.5   7.12  )-----------( 272      ( 31 Aug 2023 11:27 )             36.8                              08-31    141  |  106  |  16  ----------------------------<  95  4.8   |  30  |  1.10    Ca    9.3      31 Aug 2023 11:27  Mg     2.2     08-31    TPro  6.7  /  Alb  3.7  /  TBili  0.6  /  DBili  x   /  AST  16  /  ALT  21  /  AlkPhos  50  08-31    LIVER FUNCTIONS - ( 31 Aug 2023 11:27 )  Alb: 3.7 g/dL / Pro: 6.7 g/dL / ALK PHOS: 50 U/L / ALT: 21 U/L / AST: 16 U/L / GGT: x                                   CAPILLARY BLOOD GLUCOSE        CARDIAC MARKERS ( 30 Aug 2023 23:17 )  x     / x     / 73 U/L / x     / 1.7 ng/mL  CARDIAC MARKERS ( 30 Aug 2023 21:51 )  x     / x     / 75 U/L / x     / 1.8 ng/mL          Allergies:  No Known Allergies    MEDICATIONS  (STANDING):  aspirin enteric coated 81 milliGRAM(s) Oral daily  atorvastatin 40 milliGRAM(s) Oral at bedtime  clopidogrel Tablet 75 milliGRAM(s) Oral daily  isosorbide   mononitrate ER Tablet (IMDUR) 30 milliGRAM(s) Oral daily  NIFEdipine XL 30 milliGRAM(s) Oral daily  pantoprazole    Tablet 40 milliGRAM(s) Oral before breakfast  sodium chloride 0.9% Bolus 250 milliLiter(s) IV Bolus once  sodium chloride 0.9%. 500 milliLiter(s) (75 mL/Hr) IV Continuous <Continuous>  sodium chloride 0.9%. 500 milliLiter(s) (75 mL/Hr) IV Continuous <Continuous>    MEDICATIONS  (PRN):        DIAGNOSTIC TESTS:        CARDIOLOGY NP PROGRESS NOTE    Subjective: Pt seen and examined at bedside. Reports feeling 1/10 chest pain this AM.   Remainder ROS otherwise negative.    Overnight Events: Trop neg x 2.    TELEMETRY: SB 50s        VITAL SIGNS:  T(C): 36.5 (08-31-23 @ 11:24), Max: 36.9 (08-30-23 @ 20:59)  HR: 51 (08-31-23 @ 11:24) (51 - 64)  BP: 133/78 (08-31-23 @ 11:24) (131/79 - 167/91)  RR: 18 (08-31-23 @ 11:24) (16 - 18)  SpO2: 95% (08-31-23 @ 11:24) (95% - 98%)  Wt(kg): --    I&O's Summary    30 Aug 2023 07:01  -  31 Aug 2023 07:00  --------------------------------------------------------  IN: 0 mL / OUT: 450 mL / NET: -450 mL    31 Aug 2023 07:01  -  31 Aug 2023 13:44  --------------------------------------------------------  IN: 0 mL / OUT: 750 mL / NET: -750 mL          PHYSICAL EXAM:    General: A/ox 3, No acute Distress  Neck: Supple, NO JVD  Cardiac: S1 S2, No M/R/G  Pulmonary: CTAB, Breathing unlabored, No Rhonchi/Rales/Wheezing  Abdomen: Soft, Non -tender, +BS x 4 quads  Extremities: No Rashes, No edema  Neuro: A/o x 3, No focal deficits          LABS:                          12.5   7.12  )-----------( 272      ( 31 Aug 2023 11:27 )             36.8                              08-31    141  |  106  |  16  ----------------------------<  95  4.8   |  30  |  1.10    Ca    9.3      31 Aug 2023 11:27  Mg     2.2     08-31    TPro  6.7  /  Alb  3.7  /  TBili  0.6  /  DBili  x   /  AST  16  /  ALT  21  /  AlkPhos  50  08-31    LIVER FUNCTIONS - ( 31 Aug 2023 11:27 )  Alb: 3.7 g/dL / Pro: 6.7 g/dL / ALK PHOS: 50 U/L / ALT: 21 U/L / AST: 16 U/L / GGT: x                                   CAPILLARY BLOOD GLUCOSE        CARDIAC MARKERS ( 30 Aug 2023 23:17 )  x     / x     / 73 U/L / x     / 1.7 ng/mL  CARDIAC MARKERS ( 30 Aug 2023 21:51 )  x     / x     / 75 U/L / x     / 1.8 ng/mL          Allergies:  No Known Allergies    MEDICATIONS  (STANDING):  aspirin enteric coated 81 milliGRAM(s) Oral daily  atorvastatin 40 milliGRAM(s) Oral at bedtime  clopidogrel Tablet 75 milliGRAM(s) Oral daily  isosorbide   mononitrate ER Tablet (IMDUR) 30 milliGRAM(s) Oral daily  NIFEdipine XL 30 milliGRAM(s) Oral daily  pantoprazole    Tablet 40 milliGRAM(s) Oral before breakfast  sodium chloride 0.9% Bolus 250 milliLiter(s) IV Bolus once  sodium chloride 0.9%. 500 milliLiter(s) (75 mL/Hr) IV Continuous <Continuous>  sodium chloride 0.9%. 500 milliLiter(s) (75 mL/Hr) IV Continuous <Continuous>    MEDICATIONS  (PRN):        DIAGNOSTIC TESTS:        CARDIOLOGY NP PROGRESS NOTE    Subjective: Pt seen and examined at bedside. Reports feeling ongoing chest pain this AM. Reports had chest pain initially 4/10 at home, worsened to 7/10 in ED, then subsided to 4/10 yesterday, now 1/10. Reports chest pain improved minimally w/ NTG, but mostly w/ Maalox. Remainder ROS otherwise negative.    Overnight Events: Trop neg x 2.    TELEMETRY: SB 50s        VITAL SIGNS:  T(C): 36.5 (08-31-23 @ 11:24), Max: 36.9 (08-30-23 @ 20:59)  HR: 51 (08-31-23 @ 11:24) (51 - 64)  BP: 133/78 (08-31-23 @ 11:24) (131/79 - 167/91)  RR: 18 (08-31-23 @ 11:24) (16 - 18)  SpO2: 95% (08-31-23 @ 11:24) (95% - 98%)  Wt(kg): --    I&O's Summary    30 Aug 2023 07:01  -  31 Aug 2023 07:00  --------------------------------------------------------  IN: 0 mL / OUT: 450 mL / NET: -450 mL    31 Aug 2023 07:01  -  31 Aug 2023 13:44  --------------------------------------------------------  IN: 0 mL / OUT: 750 mL / NET: -750 mL          PHYSICAL EXAM:    General: A/ox 3, No acute Distress  Neck: Supple, NO JVD  Cardiac: S1 S2, No M/R/G  Pulmonary: CTA Gigi, Breathing unlabored on RA, No Rhonchi/Rales/Wheezing  Abdomen: Soft, Non -tender, +BS x 4 quads  Extremities: No Rashes, No edema  Vascular: 2+ radial pulses gigi, no femoral bruits gigi, 1+ DP/PT pulses gigi  Neuro: A/o x 3, No focal deficits          LABS:                          12.5   7.12  )-----------( 272      ( 31 Aug 2023 11:27 )             36.8                              08-31    141  |  106  |  16  ----------------------------<  95  4.8   |  30  |  1.10    Ca    9.3      31 Aug 2023 11:27  Mg     2.2     08-31    TPro  6.7  /  Alb  3.7  /  TBili  0.6  /  DBili  x   /  AST  16  /  ALT  21  /  AlkPhos  50  08-31    LIVER FUNCTIONS - ( 31 Aug 2023 11:27 )  Alb: 3.7 g/dL / Pro: 6.7 g/dL / ALK PHOS: 50 U/L / ALT: 21 U/L / AST: 16 U/L / GGT: x                                   CAPILLARY BLOOD GLUCOSE        CARDIAC MARKERS ( 30 Aug 2023 23:17 )  x     / x     / 73 U/L / x     / 1.7 ng/mL  CARDIAC MARKERS ( 30 Aug 2023 21:51 )  x     / x     / 75 U/L / x     / 1.8 ng/mL          Allergies:  No Known Allergies    MEDICATIONS  (STANDING):  aspirin enteric coated 81 milliGRAM(s) Oral daily  atorvastatin 40 milliGRAM(s) Oral at bedtime  clopidogrel Tablet 75 milliGRAM(s) Oral daily  isosorbide   mononitrate ER Tablet (IMDUR) 30 milliGRAM(s) Oral daily  NIFEdipine XL 30 milliGRAM(s) Oral daily  pantoprazole    Tablet 40 milliGRAM(s) Oral before breakfast  sodium chloride 0.9% Bolus 250 milliLiter(s) IV Bolus once  sodium chloride 0.9%. 500 milliLiter(s) (75 mL/Hr) IV Continuous <Continuous>  sodium chloride 0.9%. 500 milliLiter(s) (75 mL/Hr) IV Continuous <Continuous>    MEDICATIONS  (PRN):        DIAGNOSTIC TESTS:

## 2023-08-31 NOTE — DISCHARGE NOTE PROVIDER - NSDCMRMEDTOKEN_GEN_ALL_CORE_FT
aspirin 81 mg oral delayed release tablet: 1 tab(s) orally once a day  atorvastatin 40 mg oral tablet: 1 tab(s) orally once a day (at bedtime)  clopidogrel 75 mg oral tablet: 1 tab(s) orally once a day  Protonix 40 mg oral granule, delayed release: 1 each orally once a day    aspirin 81 mg oral delayed release tablet: 1 tab(s) orally once a day  atorvastatin 40 mg oral tablet: 1 tab(s) orally once a day (at bedtime)  clopidogrel 75 mg oral tablet: 1 tab(s) orally once a day  omeprazole 40 mg oral delayed release capsule: 1 cap(s) orally once a day

## 2023-08-31 NOTE — PROGRESS NOTE ADULT - NS ATTEND AMEND GEN_ALL_CORE FT
gradual onset
have made amendments to the documentation where necessary. Additional comments: 73M PMHx HLD, CAD JUNO x1mRCA, STANISLAW (not on CPA), benign pituitary adenoma  presented to [facility] ED on 08/30/23 with complaints of worsening diffuse, "achy" CP that radiates to back, neck, and L arm. Patient states that at worse CP is at 4/10.    1. Unstable angina  S.p C in 1/2023. Films reviewed: ? microcirculatory disease vs pericarditis given recent covid. Echo to evaluate pericardial effusion, if normal, discuss repeat C as an inpatient vs PET-CT as outpatient for evaluation of microcirculatory disease. add imdur and nifedipine.

## 2023-08-31 NOTE — DISCHARGE NOTE PROVIDER - NSDCFUADDINST_GEN_ALL_CORE_FT
- SEEK IMMEDIATE MEDICAL CARE IF YOU HAVE ANY OF THE FOLLOWING SYMPTOMS: worsening chest pain, racing heart beat, unexplained jaw/neck/back pain, severe abdominal pain, shortness of breath, dizziness or lightheadedness, fainting, sweaty or clammy skin, vomiting, or coughing up blood. These symptoms may represent a serious problem that is an emergency. Do not wait to see if the symptoms will go away. Get medical help right away. Call 911 and do not drive yourself to the hospital.  - Aspirin and Plavix can put you at increased risk of bleeding; please avoid NSAIDS (such as Motrin, Advil, Ibuprofen, Naproxen, or Aleve, as these medications may further your risk of bleeding   - Do NOT drive or operate hazardous machinery for 24 hours. Limit your physical activity for 24-48 hours. Do NOT engage in sports, heavy work or heavy lifting more than 5 lbs for 5 days.   - You MAY shower and wash the area gently with soap and water. BUT no TUB BATHS, HOT TUBS OR SWIMMING FOR 5 DAYS.  Do not keep the area covered. Do not put any lotions, creams, or ointments on the site.  - Your procedure was done through your right wrist. If you observe flank bleeding from the puncture site, it is an emergency. Please put direct pressure on the site and go directly to the ER. Bleeding under the skin may also occur and a small "black and blue" may be expected. If the area appears to be expanding or swelling around the puncture site, apply manual compression and go immediately to the nearest ER. If your arm/hand becomes cool or blue and/or you are unable to move it, this must be treated as an emergency, go directly to the nearest ER. Look for signs of infection in the wrist: fever, red streaking of the arm, obvious pus formation and pain.  -If you have any issues or concerns regarding your access site, you may call [facility] at [phone].

## 2023-08-31 NOTE — PROGRESS NOTE ADULT - PROBLEM SELECTOR PLAN 2
On admission BUN/Cr 20/1.63  - per review patient's baseline Cr 0.8-0.9   - will hydrate with 75cc NS x6 hours as patient will likely need CCTA vs cath in AM  - avoid nephrotoxic drugs, monitor Cr levels On admission BUN/Cr 20/1.63, baseline Cr 0.8-0.9 previous hospitalizations.  - S/p IVF hydration NS 75cc x6 hours w/ improvement crea 1.1 this AM  - c/w pre-cath IVF hydration  - avoid nephrotoxic drugs, monitor Cr levels

## 2023-09-01 ENCOUNTER — TRANSCRIPTION ENCOUNTER (OUTPATIENT)
Age: 73
End: 2023-09-01

## 2023-09-01 VITALS
HEART RATE: 63 BPM | DIASTOLIC BLOOD PRESSURE: 65 MMHG | RESPIRATION RATE: 18 BRPM | TEMPERATURE: 98 F | OXYGEN SATURATION: 95 % | SYSTOLIC BLOOD PRESSURE: 133 MMHG

## 2023-09-01 LAB
ANION GAP SERPL CALC-SCNC: 7 MMOL/L — SIGNIFICANT CHANGE UP (ref 5–17)
BUN SERPL-MCNC: 16 MG/DL — SIGNIFICANT CHANGE UP (ref 7–23)
CALCIUM SERPL-MCNC: 9 MG/DL — SIGNIFICANT CHANGE UP (ref 8.4–10.5)
CHLORIDE SERPL-SCNC: 105 MMOL/L — SIGNIFICANT CHANGE UP (ref 96–108)
CO2 SERPL-SCNC: 28 MMOL/L — SIGNIFICANT CHANGE UP (ref 22–31)
CREAT SERPL-MCNC: 1 MG/DL — SIGNIFICANT CHANGE UP (ref 0.5–1.3)
EGFR: 79 ML/MIN/1.73M2 — SIGNIFICANT CHANGE UP
GLUCOSE SERPL-MCNC: 90 MG/DL — SIGNIFICANT CHANGE UP (ref 70–99)
HCT VFR BLD CALC: 38.4 % — LOW (ref 39–50)
HGB BLD-MCNC: 13 G/DL — SIGNIFICANT CHANGE UP (ref 13–17)
ISTAT ACTK (ACTIVATED CLOTTING TIME KAOLIN): 312 SEC — HIGH (ref 74–137)
MAGNESIUM SERPL-MCNC: 2.2 MG/DL — SIGNIFICANT CHANGE UP (ref 1.6–2.6)
MCHC RBC-ENTMCNC: 31.9 PG — SIGNIFICANT CHANGE UP (ref 27–34)
MCHC RBC-ENTMCNC: 33.9 GM/DL — SIGNIFICANT CHANGE UP (ref 32–36)
MCV RBC AUTO: 94.1 FL — SIGNIFICANT CHANGE UP (ref 80–100)
NRBC # BLD: 0 /100 WBCS — SIGNIFICANT CHANGE UP (ref 0–0)
PLATELET # BLD AUTO: 267 K/UL — SIGNIFICANT CHANGE UP (ref 150–400)
POTASSIUM SERPL-MCNC: 4.2 MMOL/L — SIGNIFICANT CHANGE UP (ref 3.5–5.3)
POTASSIUM SERPL-SCNC: 4.2 MMOL/L — SIGNIFICANT CHANGE UP (ref 3.5–5.3)
RBC # BLD: 4.08 M/UL — LOW (ref 4.2–5.8)
RBC # FLD: 13.3 % — SIGNIFICANT CHANGE UP (ref 10.3–14.5)
SODIUM SERPL-SCNC: 140 MMOL/L — SIGNIFICANT CHANGE UP (ref 135–145)
WBC # BLD: 8.58 K/UL — SIGNIFICANT CHANGE UP (ref 3.8–10.5)
WBC # FLD AUTO: 8.58 K/UL — SIGNIFICANT CHANGE UP (ref 3.8–10.5)

## 2023-09-01 PROCEDURE — 99239 HOSP IP/OBS DSCHRG MGMT >30: CPT

## 2023-09-01 PROCEDURE — 85347 COAGULATION TIME ACTIVATED: CPT

## 2023-09-01 PROCEDURE — 83690 ASSAY OF LIPASE: CPT

## 2023-09-01 PROCEDURE — C1894: CPT

## 2023-09-01 PROCEDURE — C1769: CPT

## 2023-09-01 PROCEDURE — 84443 ASSAY THYROID STIM HORMONE: CPT

## 2023-09-01 PROCEDURE — 83036 HEMOGLOBIN GLYCOSYLATED A1C: CPT

## 2023-09-01 PROCEDURE — 83735 ASSAY OF MAGNESIUM: CPT

## 2023-09-01 PROCEDURE — 93005 ELECTROCARDIOGRAM TRACING: CPT

## 2023-09-01 PROCEDURE — 84484 ASSAY OF TROPONIN QUANT: CPT

## 2023-09-01 PROCEDURE — 85025 COMPLETE CBC W/AUTO DIFF WBC: CPT

## 2023-09-01 PROCEDURE — 80048 BASIC METABOLIC PNL TOTAL CA: CPT

## 2023-09-01 PROCEDURE — 85027 COMPLETE CBC AUTOMATED: CPT

## 2023-09-01 PROCEDURE — 82553 CREATINE MB FRACTION: CPT

## 2023-09-01 PROCEDURE — 71045 X-RAY EXAM CHEST 1 VIEW: CPT

## 2023-09-01 PROCEDURE — 80061 LIPID PANEL: CPT

## 2023-09-01 PROCEDURE — 93306 TTE W/DOPPLER COMPLETE: CPT

## 2023-09-01 PROCEDURE — 99285 EMERGENCY DEPT VISIT HI MDM: CPT

## 2023-09-01 PROCEDURE — 86803 HEPATITIS C AB TEST: CPT

## 2023-09-01 PROCEDURE — 36415 COLL VENOUS BLD VENIPUNCTURE: CPT

## 2023-09-01 PROCEDURE — C1887: CPT

## 2023-09-01 PROCEDURE — 80053 COMPREHEN METABOLIC PANEL: CPT

## 2023-09-01 PROCEDURE — 82550 ASSAY OF CK (CPK): CPT

## 2023-09-01 RX ORDER — OMEPRAZOLE 10 MG/1
1 CAPSULE, DELAYED RELEASE ORAL
Refills: 0 | DISCHARGE

## 2023-09-01 RX ADMIN — Medication 81 MILLIGRAM(S): at 08:22

## 2023-09-01 RX ADMIN — CLOPIDOGREL BISULFATE 75 MILLIGRAM(S): 75 TABLET, FILM COATED ORAL at 08:23

## 2023-09-01 RX ADMIN — PANTOPRAZOLE SODIUM 40 MILLIGRAM(S): 20 TABLET, DELAYED RELEASE ORAL at 05:32

## 2023-09-01 NOTE — DISCHARGE NOTE NURSING/CASE MANAGEMENT/SOCIAL WORK - NSDCPEFALRISK_GEN_ALL_CORE
For information on Fall & Injury Prevention, visit: [email or web address] OR  [email or web address] OR  https://www.cdc.gov/steadi/patient.html

## 2023-09-01 NOTE — DISCHARGE NOTE NURSING/CASE MANAGEMENT/SOCIAL WORK - PATIENT PORTAL LINK FT
Quality 110: Preventive Care And Screening: Influenza Immunization: Influenza Immunization previously received during influenza season
Detail Level: Detailed
You can access the FollowMyHealth Patient Portal offered by [facility] by registering at the following website: [email or web address]. By joining [facility]’s FollowMyHealth portal, you will also be able to view your health information using other applications (apps) compatible with our system.

## 2023-09-07 DIAGNOSIS — K44.9 DIAPHRAGMATIC HERNIA WITHOUT OBSTRUCTION OR GANGRENE: ICD-10-CM

## 2023-09-07 DIAGNOSIS — G47.33 OBSTRUCTIVE SLEEP APNEA (ADULT) (PEDIATRIC): ICD-10-CM

## 2023-09-07 DIAGNOSIS — N17.9 ACUTE KIDNEY FAILURE, UNSPECIFIED: ICD-10-CM

## 2023-09-07 DIAGNOSIS — I25.10 ATHEROSCLEROTIC HEART DISEASE OF NATIVE CORONARY ARTERY WITHOUT ANGINA PECTORIS: ICD-10-CM

## 2023-09-07 DIAGNOSIS — Z95.5 PRESENCE OF CORONARY ANGIOPLASTY IMPLANT AND GRAFT: ICD-10-CM

## 2023-09-07 DIAGNOSIS — Z86.16 PERSONAL HISTORY OF COVID-19: ICD-10-CM

## 2023-09-07 DIAGNOSIS — I25.119 ATHEROSCLEROTIC HEART DISEASE OF NATIVE CORONARY ARTERY WITH UNSPECIFIED ANGINA PECTORIS: ICD-10-CM

## 2023-09-07 DIAGNOSIS — D35.2 BENIGN NEOPLASM OF PITUITARY GLAND: ICD-10-CM

## 2023-09-07 DIAGNOSIS — E78.5 HYPERLIPIDEMIA, UNSPECIFIED: ICD-10-CM

## 2023-09-15 ENCOUNTER — NON-APPOINTMENT (OUTPATIENT)
Age: 73
End: 2023-09-15

## 2024-01-01 ENCOUNTER — NON-APPOINTMENT (OUTPATIENT)
Age: 74
End: 2024-01-01

## 2024-02-05 NOTE — ED PROVIDER NOTE - RATE
[TextEntry] : General: The patient was alert, oriented and in no distress. Voice was clear.  Face: The patient had no facial asymmetry or mass. The skin was unremarkable.  Ears: Hearing normal to conversational voice External ears were normal without deformity. Ear canals:  scant wax was removed atraumatically under the microscope with otologic instruments.  No inflammation Tympanic membranes: Intact.  No perforation or effusion  Nose: The external nose had no significant deformity.  On anterior rhinoscopy, the nasal mucosa was normal.  The anterior septum was grossly midline. There were no visualized polyps, purulence  or masses.  Oral cavity: Oral mucosa- normal. Oral and base of tongue- clear and without mass. Gingival and buccal mucosa- moist and without lesions. Palate- the palate moved well. There was no cleft palate. There appeared to be good salivary flow.  Oral cavity/oropharynx- no pus, erythema or mass   Neck: The neck was symmetrical. The parotid and submandibular glands were normal without masses. The trachea was midline and there was no unusual crepitus. Thyroid was enlarged. No masses  Lymphatics: Cervical adenopathy- none.   PROCEDURE:  FLEXIBLE LARYNGOSCOPY, NASAL ENDOSCOPY with video.  Exam was reviewed the patient  Surgeon: Dr. Castellanos Indication:   chronic rhinitis, reflux. laryngeal cyst, cough, assess for sinus infection, inadequate transoral exam Anesthetic: Topical lidocaine and decongestant Procedure: The patient was placed in a sitting position.  Following application of the topical anesthetic and decongestant, exam was performed with a flexible telescope.  The scope was passed along the right nasal floor to the nasopharynx.  It was then passed into the region of the middle meatus, middle turbinate, and sphenoethmoid region.  An identical procedure was performed on the left side.  The following findings were noted:  Nasal mucosa:  mild edema Septum: Deviated bilaterally but more to the right Right nasal cavity      Inferior turbinate:  enlarged      Middle turbinate: normal      Superior turbinate: normal      Inferior meatus: no pus, polyps or congestion      Middle meatus:  no pus, polyps or congestion      Superior meatus:  no pus, polyps, or congestion      Sphenoethmoidal recess: no pus, polyps or congestion Left nasal cavity      Inferior turbinate:  enlarged      Middle turbinate: normal      Superior turbinate: normal      Inferior meatus: no pus, polyps or congestion      Middle meatus: no pus, polyps, or congestion      Superior meatus:  no pus, polyps, or congestion      Sphenoethmoidal recess: no pus, polyps or congestion  Nasopharynx: no masses, choanae patent, no adenoid tissue  Base of tongue and vallecula: no masses or asymmetry Posterior pharyngeal wall: no masses. Hypopharynx: symmetrical. No masses Pyriform sinuses: no lesions or pooling of secretions.    Epiglottis: normal. No edema or lesions Aryepiglottic folds: Right side- normal. No lesions. Left side- Smooth 1 cm lesion on the anterior lateral surface of the AE fold. There is no obstruction of the airway (no ball-valving). exam is unchanged True vocal cords: clear and mobile. No lesions. Airway patent False vocal cords: normal Ventricles: no masses. Arytenoids:  mild edema Interarytenoid area: no masses.  Moderate edema Subglottis: normal. no masses    AUDIOGRAM- test ordered and the results reviewed and discussed with the patient.  Was compared with the prior audio Hearing-mild bilateral high-frequency sensorineural hearing loss Word recognition-normal Tympanograms- AD- type A, AS- type A
63

## 2024-02-22 PROBLEM — D35.2 BENIGN NEOPLASM OF PITUITARY GLAND: Chronic | Status: ACTIVE | Noted: 2023-08-31

## 2024-02-22 PROBLEM — G47.33 OBSTRUCTIVE SLEEP APNEA (ADULT) (PEDIATRIC): Chronic | Status: ACTIVE | Noted: 2023-08-31

## 2024-03-03 ENCOUNTER — NON-APPOINTMENT (OUTPATIENT)
Age: 74
End: 2024-03-03

## 2024-05-16 NOTE — ED ADULT NURSE NOTE - COVID-19 RESULT
Surgery Scheduling:  -Recommended surgery: Bilateral Myringotomy with PE tubes  -Diagnosis: ETD  -Length: 10 min  -Provider: Dr. Richardson  -Type of surgery: Same day  - Location: [place]  -Post surgery follow up: 6 weeks with Audio with Dr. Richardson or DEISY Borrero    -MyChart: YES / No    Marilyn Moore RN   NEGATIVE

## 2024-05-20 ENCOUNTER — NON-APPOINTMENT (OUTPATIENT)
Age: 74
End: 2024-05-20

## 2024-05-21 PROBLEM — Z00.00 ENCOUNTER FOR PREVENTIVE HEALTH EXAMINATION: Status: ACTIVE | Noted: 2024-05-21

## 2024-05-22 ENCOUNTER — APPOINTMENT (OUTPATIENT)
Dept: PULMONOLOGY | Facility: CLINIC | Age: 74
End: 2024-05-22
Payer: COMMERCIAL

## 2024-05-22 ENCOUNTER — TRANSCRIPTION ENCOUNTER (OUTPATIENT)
Age: 74
End: 2024-05-22

## 2024-05-22 VITALS
WEIGHT: 203 LBS | HEART RATE: 79 BPM | BODY MASS INDEX: 39.85 KG/M2 | OXYGEN SATURATION: 95 % | HEIGHT: 60 IN | SYSTOLIC BLOOD PRESSURE: 136 MMHG | TEMPERATURE: 98.06 F | DIASTOLIC BLOOD PRESSURE: 89 MMHG

## 2024-05-22 DIAGNOSIS — G47.33 OBSTRUCTIVE SLEEP APNEA (ADULT) (PEDIATRIC): ICD-10-CM

## 2024-05-22 DIAGNOSIS — R06.83 SNORING: ICD-10-CM

## 2024-05-22 DIAGNOSIS — Z95.5 PRESENCE OF CORONARY ANGIOPLASTY IMPLANT AND GRAFT: ICD-10-CM

## 2024-05-22 PROCEDURE — 99204 OFFICE O/P NEW MOD 45 MIN: CPT

## 2024-05-22 NOTE — HISTORY OF PRESENT ILLNESS
[FreeTextEntry1] : Initial visit for this 73-year-old  who has previously been diagnosed with obstructive sleep apnea.  He tells me in 1998 he had overnight polysomnogram at [facility] in [place] and was treated with CPAP for about 1 year at that time.  He was evaluated once again in 2011 with overnight polysomnography and again used CPAP for a few years.  He is here now for reevaluation.  He is told of severe snoring.  He does not really report daytime sleepiness, [place] sleepiness score 4 out of 24.  Usual bedtime is between 11 and midnight, sleep latency 15 minutes, 1 or 2 awakenings before getting up between 530 weekdays and 8 AM weekends.  He often awakens with a headache and with nasal or sinus congestion.  He has had a stent to his right coronary artery in 2023.  He has chronic gastroesophageal reflux symptoms for which she uses omeprazole 40 mg daily.  He is also on 1 baby aspirin a day.  Otherwise he tells me he is in good health.  He has never smoked.

## 2024-05-22 NOTE — ASSESSMENT
[FreeTextEntry1] : Obstructive sleep apnea on past testing by history He needs to be reevaluated and probably treated once again for sleep apnea.  I have assured him that the treatment has become somewhat easier over the last several years.  The patient is advised that in addition to worsening sleep leading to daytime sleepiness, sleep apnea, at least when severe, may be associated with worsening hypertension and diabetes, and may be a risk factor for cardiovascular disease  Based on history and physical exam, sleep disordered breathing is   likely.  The patient was advised to have overnight unattended home sleep testing as a first approach.  If this is not definitive may need overnight polysomnography. Will be seen in follow up after testing.  and stroke.

## 2024-05-22 NOTE — PHYSICAL EXAM
[General Appearance - Well Developed] : well developed [Normal Appearance] : normal appearance [Well Groomed] : well groomed [General Appearance - Well Nourished] : well nourished [No Deformities] : no deformities [General Appearance - In No Acute Distress] : no acute distress [Normal Conjunctiva] : the conjunctiva exhibited no abnormalities [Eyelids - No Xanthelasma] : the eyelids demonstrated no xanthelasmas [Elongated Uvula] : elongated uvula [IV] : IV [Neck Appearance] : the appearance of the neck was normal [Neck Cervical Mass (___cm)] : no neck mass was observed [Jugular Venous Distention Increased] : there was no jugular-venous distention [Thyroid Diffuse Enlargement] : the thyroid was not enlarged [Thyroid Nodule] : there were no palpable thyroid nodules [Heart Rate And Rhythm] : heart rate was normal and rhythm regular [Heart Sounds] : normal S1 and S2 [Heart Sounds Gallop] : no gallops [Murmurs] : no murmurs [Heart Sounds Pericardial Friction Rub] : no pericardial rub [Auscultation Breath Sounds / Voice Sounds] : lungs were clear to auscultation bilaterally [Abnormal Walk] : normal gait [Musculoskeletal - Swelling] : no joint swelling seen [Motor Tone] : muscle strength and tone were normal [Nail Clubbing] : no clubbing of the fingernails [Cyanosis, Localized] : no localized cyanosis [Petechial Hemorrhages (___cm)] : no petechial hemorrhages [] : no ischemic changes [1+ Pitting] : 1+  pitting [Deep Tendon Reflexes (DTR)] : deep tendon reflexes were 2+ and symmetric [Sensation] : the sensory exam was normal to light touch and pinprick [No Focal Deficits] : no focal deficits [Oriented To Time, Place, And Person] : oriented to person, place, and time [Impaired Insight] : insight and judgment were intact [Affect] : the affect was normal

## 2024-06-11 ENCOUNTER — APPOINTMENT (OUTPATIENT)
Dept: SLEEP CENTER | Facility: HOME HEALTH | Age: 74
End: 2024-06-11

## 2024-06-11 ENCOUNTER — OUTPATIENT (OUTPATIENT)
Dept: OUTPATIENT SERVICES | Facility: HOSPITAL | Age: 74
LOS: 1 days | End: 2024-06-11

## 2024-06-11 DIAGNOSIS — Z98.890 OTHER SPECIFIED POSTPROCEDURAL STATES: Chronic | ICD-10-CM

## 2024-06-11 PROCEDURE — 95800 SLP STDY UNATTENDED: CPT

## 2024-06-11 PROCEDURE — 95800 SLP STDY UNATTENDED: CPT | Mod: 26

## 2024-06-19 DIAGNOSIS — G47.33 OBSTRUCTIVE SLEEP APNEA (ADULT) (PEDIATRIC): ICD-10-CM

## 2024-07-10 ENCOUNTER — NON-APPOINTMENT (OUTPATIENT)
Age: 74
End: 2024-07-10

## 2024-07-11 ENCOUNTER — APPOINTMENT (OUTPATIENT)
Dept: PULMONOLOGY | Facility: CLINIC | Age: 74
End: 2024-07-11
Payer: COMMERCIAL

## 2024-07-11 VITALS
TEMPERATURE: 207.68 F | WEIGHT: 203 LBS | OXYGEN SATURATION: 96 % | HEIGHT: 60 IN | BODY MASS INDEX: 39.85 KG/M2 | SYSTOLIC BLOOD PRESSURE: 150 MMHG | DIASTOLIC BLOOD PRESSURE: 84 MMHG | HEART RATE: 71 BPM

## 2024-07-11 PROCEDURE — 99214 OFFICE O/P EST MOD 30 MIN: CPT

## 2024-07-20 NOTE — H&P ADULT - ASSESSMENT
room air 71yo male, no significant PMHx who presents to [facility] ED 6/22 with c/o unstable angina, Trop negative, s/p CTA Cors with significant moderate to severe stenosis in the mLAD and mRCA, admitted to cardiac telemetry with plan for cardiac catheterization with Dr. Neely on 6/23.       Sedation Plan:    Moderate    Patient Is Suitable Candidate For Sedation   Yes     Cath Order Entered: yES  DAPT LOAD Ordered: Loaded with ASA 325mg and Plavix 600mg on 6/22  Pre-Cath fluids Ordered: NS 75cc/hr x12hrs    Risks Benefits Annotation:     CARDIAC CATH: Risks & benefits of procedure and sedation and risks and benefits for the alternative therapy have been explained to the patient and/or HCP in layman’s terms including but not limited to: allergic reaction, bleeding, infection, arrhythmia, respiratory compromise, renal and vascular compromise, limb damage, MI, CVA, emergent CABG/Vascular Surgery and death. Informed consent obtained and in chart.

## 2024-10-09 ENCOUNTER — NON-APPOINTMENT (OUTPATIENT)
Age: 74
End: 2024-10-09

## 2024-10-10 NOTE — ED PROVIDER NOTE - NS ED MD DISPO SPECIAL CONSIDERATION1
Left message confirming his 10/17 procedure with Dr. Rubi. He will need a  and will be called day prior with his arrival time. If he still needs his prep instructions, they are in his my chart. However, for this procedure it is nothing to eat after midnight Wednesday except clear liquids up to 4 hours prior to his procedure. Any questions, he may call.   None

## 2024-10-13 ENCOUNTER — NON-APPOINTMENT (OUTPATIENT)
Age: 74
End: 2024-10-13

## 2024-10-22 NOTE — H&P ADULT - BIRTH SEX
Intubation    Date/Time: 10/22/2024 11:48 AM    Performed by: Rony Holloway CRNA  Authorized by: Mendez Lei MD    Intubation:     Induction:  Intravenous    Intubated:  Postinduction    Mask Ventilation:  Easy mask    Attempts:  1    Attempted By:  CRNA    Method of Intubation:  Video laryngoscopy    Blade:  Johnson 3    Laryngeal View Grade: Grade IIA - cords partially seen      Difficult Airway Encountered?: No      Complications:  None    Airway Device:  Oral endotracheal tube    Airway Device Size:  7.5    Style/Cuff Inflation:  Cuffed    Tube secured:  22    Secured at:  The lips    Placement Verified By:  Capnometry    Complicating Factors:  Poor neck/head extension, oropharyngeal edema or fat, obesity and short neck    Findings Post-Intubation:  BS equal bilateral       Male

## 2024-10-30 ENCOUNTER — APPOINTMENT (OUTPATIENT)
Dept: PULMONOLOGY | Facility: CLINIC | Age: 74
End: 2024-10-30
Payer: COMMERCIAL

## 2024-10-30 ENCOUNTER — NON-APPOINTMENT (OUTPATIENT)
Age: 74
End: 2024-10-30

## 2024-10-30 VITALS
BODY MASS INDEX: 30.77 KG/M2 | OXYGEN SATURATION: 94 % | DIASTOLIC BLOOD PRESSURE: 86 MMHG | SYSTOLIC BLOOD PRESSURE: 138 MMHG | HEIGHT: 68 IN | WEIGHT: 203 LBS | HEART RATE: 78 BPM | TEMPERATURE: 98.4 F | RESPIRATION RATE: 12 BRPM

## 2024-10-30 DIAGNOSIS — G47.33 OBSTRUCTIVE SLEEP APNEA (ADULT) (PEDIATRIC): ICD-10-CM

## 2024-10-30 PROCEDURE — 99214 OFFICE O/P EST MOD 30 MIN: CPT

## 2024-11-14 NOTE — PROGRESS NOTE ADULT - ASSESSMENT
73yo male with no significant PMHx who presents to [facility] ED 6/22 with c/o unstable angina, found to have abnormal CCTA now pending cardiac catheterization with Dr. Marina on 6/23.      DISPLAY PLAN FREE TEXT Patient is a 73 y/o M, with PMHX of CAD (s/p recently underwent PCI at [facility] 6/23/22: s/p JUNO x 1 mRCA (85%); LM normal, mLAD mild diffuse, LCx normal, D1 mild dz, RPDA mild dz, EDP 8, access R TR w/ Dr. Marina), who presented to [facility] ED on 1/18/23 with complaints of pressure-like chest pain that started when he woke up this morning. Patient states his CP radiates across his upper back and is associated with SOB. He states his CP has intensified throughout the day. Patient troponin negative x 2, underwent CCTA 1/18/23: In-stent stenosis of the mid RCA stent.  Mild-moderate stenosis of the mid LAD. Mild stenoses of the proximal LAD, proximal to mid LCX, proximal RCA and RPL. Patient pre-cathed/consented, added to the schedule, and will undergo cardiac cath with Dr. Marina on 1/19/2     73 y/o M, with PMHX of CAD (s/p recently underwent PCI at [facility] 6/23/22: s/p JUNO x 1 mRCA (85%); LM normal, mLAD mild diffuse, LCx normal, D1 mild dz, RPDA mild dz, EDP 8, access R TR w/ Dr. Marina), who presented to [facility] ED 1/18/23 with CC of CP, pt s/p CCTA 1/18/23:   In-stent stenosis of the mid RCA stent.  Plan for cardiac cath with Dr. Marina 1/19.

## 2024-11-27 NOTE — ED ADULT NURSE NOTE - TEMPLATE
Patient presents with complaints of increased shortness of breath over the last several months. Patient reports chest discomfort for several months as well. Patient alert and oriented. Skin pink, warm, and dry. Patient 99% on room air. No distress noted on arrival. Respirations equal and unlabored.    TODAY:  - completed [place] forms  - establish care appointment  - contacts updated  - meds refilled   General

## 2024-12-23 ENCOUNTER — NON-APPOINTMENT (OUTPATIENT)
Age: 74
End: 2024-12-23

## 2024-12-28 ENCOUNTER — NON-APPOINTMENT (OUTPATIENT)
Age: 74
End: 2024-12-28

## 2025-02-21 ENCOUNTER — NON-APPOINTMENT (OUTPATIENT)
Age: 75
End: 2025-02-21

## 2025-04-04 ENCOUNTER — EMERGENCY (EMERGENCY)
Facility: HOSPITAL | Age: 75
LOS: 1 days | Discharge: HOME | End: 2025-04-04
Attending: STUDENT IN AN ORGANIZED HEALTH CARE EDUCATION/TRAINING PROGRAM | Admitting: STUDENT IN AN ORGANIZED HEALTH CARE EDUCATION/TRAINING PROGRAM
Payer: COMMERCIAL

## 2025-04-04 VITALS
HEART RATE: 57 BPM | SYSTOLIC BLOOD PRESSURE: 154 MMHG | DIASTOLIC BLOOD PRESSURE: 79 MMHG | RESPIRATION RATE: 16 BRPM | OXYGEN SATURATION: 97 % | TEMPERATURE: 98 F

## 2025-04-04 VITALS
RESPIRATION RATE: 16 BRPM | HEART RATE: 70 BPM | SYSTOLIC BLOOD PRESSURE: 166 MMHG | DIASTOLIC BLOOD PRESSURE: 88 MMHG | OXYGEN SATURATION: 96 % | TEMPERATURE: 98 F

## 2025-04-04 DIAGNOSIS — Z98.890 OTHER SPECIFIED POSTPROCEDURAL STATES: Chronic | ICD-10-CM

## 2025-04-04 LAB
ALBUMIN SERPL ELPH-MCNC: 4.6 G/DL — SIGNIFICANT CHANGE UP (ref 3.3–5)
ALP SERPL-CCNC: 39 U/L — LOW (ref 40–120)
ALT FLD-CCNC: <5 U/L — LOW (ref 10–45)
ANION GAP SERPL CALC-SCNC: 15 MMOL/L — SIGNIFICANT CHANGE UP (ref 5–17)
APTT BLD: 27 SEC — SIGNIFICANT CHANGE UP (ref 24.5–35.6)
AST SERPL-CCNC: <5 U/L — LOW (ref 10–40)
BASOPHILS # BLD AUTO: 0.05 K/UL — SIGNIFICANT CHANGE UP (ref 0–0.2)
BASOPHILS NFR BLD AUTO: 0.8 % — SIGNIFICANT CHANGE UP (ref 0–2)
BILIRUB SERPL-MCNC: 0.2 MG/DL — SIGNIFICANT CHANGE UP (ref 0.2–1.2)
BUN SERPL-MCNC: 24 MG/DL — HIGH (ref 7–23)
CALCIUM SERPL-MCNC: 9 MG/DL — SIGNIFICANT CHANGE UP (ref 8.4–10.5)
CHLORIDE SERPL-SCNC: 104 MMOL/L — SIGNIFICANT CHANGE UP (ref 96–108)
CO2 SERPL-SCNC: 20 MMOL/L — LOW (ref 22–31)
CREAT SERPL-MCNC: 0.77 MG/DL — SIGNIFICANT CHANGE UP (ref 0.5–1.3)
EGFR: 94 ML/MIN/1.73M2 — SIGNIFICANT CHANGE UP
EGFR: 94 ML/MIN/1.73M2 — SIGNIFICANT CHANGE UP
EOSINOPHIL # BLD AUTO: 0.26 K/UL — SIGNIFICANT CHANGE UP (ref 0–0.5)
EOSINOPHIL NFR BLD AUTO: 4.2 % — SIGNIFICANT CHANGE UP (ref 0–6)
GLUCOSE SERPL-MCNC: 76 MG/DL — SIGNIFICANT CHANGE UP (ref 70–99)
HCT VFR BLD CALC: 38.4 % — LOW (ref 39–50)
HGB BLD-MCNC: 13.2 G/DL — SIGNIFICANT CHANGE UP (ref 13–17)
IMM GRANULOCYTES NFR BLD AUTO: 0.2 % — SIGNIFICANT CHANGE UP (ref 0–0.9)
INR BLD: 0.94 — SIGNIFICANT CHANGE UP (ref 0.85–1.16)
LYMPHOCYTES # BLD AUTO: 1.9 K/UL — SIGNIFICANT CHANGE UP (ref 1–3.3)
LYMPHOCYTES # BLD AUTO: 30.7 % — SIGNIFICANT CHANGE UP (ref 13–44)
MAGNESIUM SERPL-MCNC: 2.3 MG/DL — SIGNIFICANT CHANGE UP (ref 1.6–2.6)
MCHC RBC-ENTMCNC: 33 PG — SIGNIFICANT CHANGE UP (ref 27–34)
MCHC RBC-ENTMCNC: 34.4 G/DL — SIGNIFICANT CHANGE UP (ref 32–36)
MCV RBC AUTO: 96 FL — SIGNIFICANT CHANGE UP (ref 80–100)
MONOCYTES # BLD AUTO: 0.68 K/UL — SIGNIFICANT CHANGE UP (ref 0–0.9)
MONOCYTES NFR BLD AUTO: 11 % — SIGNIFICANT CHANGE UP (ref 2–14)
NEUTROPHILS # BLD AUTO: 3.29 K/UL — SIGNIFICANT CHANGE UP (ref 1.8–7.4)
NEUTROPHILS NFR BLD AUTO: 53.1 % — SIGNIFICANT CHANGE UP (ref 43–77)
NRBC BLD AUTO-RTO: 0 /100 WBCS — SIGNIFICANT CHANGE UP (ref 0–0)
PHOSPHATE SERPL-MCNC: 4.6 MG/DL — HIGH (ref 2.5–4.5)
PLATELET # BLD AUTO: 256 K/UL — SIGNIFICANT CHANGE UP (ref 150–400)
POTASSIUM SERPL-MCNC: 5.1 MMOL/L — SIGNIFICANT CHANGE UP (ref 3.5–5.3)
POTASSIUM SERPL-SCNC: 5.1 MMOL/L — SIGNIFICANT CHANGE UP (ref 3.5–5.3)
PROT SERPL-MCNC: 7.1 G/DL — SIGNIFICANT CHANGE UP (ref 6–8.3)
PROTHROM AB SERPL-ACNC: 10.8 SEC — SIGNIFICANT CHANGE UP (ref 9.9–13.4)
RBC # BLD: 4 M/UL — LOW (ref 4.2–5.8)
RBC # FLD: 13.8 % — SIGNIFICANT CHANGE UP (ref 10.3–14.5)
SODIUM SERPL-SCNC: 139 MMOL/L — SIGNIFICANT CHANGE UP (ref 135–145)
TROPONIN T, HIGH SENSITIVITY RESULT: 8 NG/L — SIGNIFICANT CHANGE UP (ref 0–51)
TROPONIN T, HIGH SENSITIVITY RESULT: 9 NG/L — SIGNIFICANT CHANGE UP (ref 0–51)
WBC # BLD: 6.19 K/UL — SIGNIFICANT CHANGE UP (ref 3.8–10.5)
WBC # FLD AUTO: 6.19 K/UL — SIGNIFICANT CHANGE UP (ref 3.8–10.5)

## 2025-04-04 PROCEDURE — 99285 EMERGENCY DEPT VISIT HI MDM: CPT

## 2025-04-04 PROCEDURE — 93005 ELECTROCARDIOGRAM TRACING: CPT

## 2025-04-04 PROCEDURE — 71045 X-RAY EXAM CHEST 1 VIEW: CPT

## 2025-04-04 PROCEDURE — 96374 THER/PROPH/DIAG INJ IV PUSH: CPT

## 2025-04-04 PROCEDURE — 84100 ASSAY OF PHOSPHORUS: CPT

## 2025-04-04 PROCEDURE — 99285 EMERGENCY DEPT VISIT HI MDM: CPT | Mod: 25

## 2025-04-04 PROCEDURE — 84484 ASSAY OF TROPONIN QUANT: CPT

## 2025-04-04 PROCEDURE — 36415 COLL VENOUS BLD VENIPUNCTURE: CPT

## 2025-04-04 PROCEDURE — 71045 X-RAY EXAM CHEST 1 VIEW: CPT | Mod: 26

## 2025-04-04 PROCEDURE — 85610 PROTHROMBIN TIME: CPT

## 2025-04-04 PROCEDURE — 85025 COMPLETE CBC W/AUTO DIFF WBC: CPT

## 2025-04-04 PROCEDURE — 80053 COMPREHEN METABOLIC PANEL: CPT

## 2025-04-04 PROCEDURE — 83735 ASSAY OF MAGNESIUM: CPT

## 2025-04-04 PROCEDURE — 93010 ELECTROCARDIOGRAM REPORT: CPT

## 2025-04-04 PROCEDURE — 85730 THROMBOPLASTIN TIME PARTIAL: CPT

## 2025-04-04 RX ORDER — MAGNESIUM, ALUMINUM HYDROXIDE 200-200 MG
30 TABLET,CHEWABLE ORAL ONCE
Refills: 0 | Status: COMPLETED | OUTPATIENT
Start: 2025-04-04 | End: 2025-04-04

## 2025-04-04 RX ORDER — ASPIRIN 325 MG
324 TABLET ORAL ONCE
Refills: 0 | Status: COMPLETED | OUTPATIENT
Start: 2025-04-04 | End: 2025-04-04

## 2025-04-04 RX ADMIN — Medication 324 MILLIGRAM(S): at 14:17

## 2025-04-04 RX ADMIN — Medication 20 MILLIGRAM(S): at 14:16

## 2025-04-04 RX ADMIN — Medication 30 MILLILITER(S): at 14:17

## 2025-04-04 NOTE — ED PROVIDER NOTE - PROGRESS NOTE DETAILS
Feels much improved. No current chest pain. Offered further inpatient evaluation, but patient wishes to follow up with his cardiologist Dr. Smith. STRICT return precautions, recommended that he resume aspirin 81 mg and his pantoprazole.

## 2025-04-04 NOTE — ED PROVIDER NOTE - NSFOLLOWUPINSTRUCTIONS_ED_ALL_ED_FT
Please follow up with Dr. Smith next week  Resume Aspirin 81 mg daily and your pantoprazole  Seek Medical attention or return to the emergency department for any new or worsening symptoms, such as recurrent chest pain, shortness of breath.

## 2025-04-04 NOTE — ED PROVIDER NOTE - OBJECTIVE STATEMENT
75 y/o M PMH of CAD s/p PCI to RCA ('22), HTN, HLD presenting with chest pain.     States started around 11AM while patient was sitting at his desk at work. Patient states resolved almost completely, currently 1/10. Patient is noncompliant with aspirin and PPi, follows with cardiologist Dr. Smith. Was admitted in Aug '23 with unremarkable at that time. No shortness of breath. 75 y/o M PMH of CAD s/p PCI to RCA ('22), HTN, HLD presenting with chest pain.     States started around 11AM while patient was sitting at his desk at work. Patient states resolved almost completely, currently 1/10. Patient is noncompliant with aspirin and PPi, follows with cardiologist Dr. Smith. Was admitted in Aug '23 with unremarkable cath at that time (mild nonobstructive disease). No shortness of breath. No associated n/v, diaphoresis, sob.

## 2025-04-04 NOTE — ED PROVIDER NOTE - CLINICAL SUMMARY MEDICAL DECISION MAKING FREE TEXT BOX
73 y/o M PMH of CAD s/p PCI to RCA ('22), HTN, HLD presenting with chest pain.   EKG with no acute ischemic changes  Currently symptoms resolved  ASA Load  GI cocktail  Cardiac enzymes x 2  Reassess to determine disposition, patient requesting outpatient follow up if cardiac enzymes negative.   Cath reviewed Aug '23. 75 y/o M PMH of CAD s/p PCI to RCA ('22), HTN, HLD presenting with chest pain.   EKG with no acute ischemic changes  Currently symptoms resolved  ASA Load  GI cocktail  Cardiac enzymes x 2  Reassess to determine disposition, patient requesting outpatient follow up if cardiac enzymes negative. HEART Score 4  Cath reviewed Aug '23.

## 2025-04-04 NOTE — ED ADULT NURSE NOTE - OBJECTIVE STATEMENT
pt is a 73 yo M c/o bilateral cp while sitting at his desk around 11am today. lasted for about an hour, currently denies any pain/discomfort. denies assoc sob, back/jaw/neck/abd/arms, n/v, palpitations.   pmhx CAD s/p PCI 2022, not on asa or plavix.  pt in nad, a&ox4, respirations even and unlabored. placed on cardiac mon.

## 2025-04-04 NOTE — ED ADULT TRIAGE NOTE - CHIEF COMPLAINT QUOTE
Pt presents to the ED for chest pain that radiated to his back since this afternoon. Pt endorses that pain was intense, lasting for 15 minutes before it diminished. Pt states "I have history of an RCA."

## 2025-04-07 DIAGNOSIS — Z95.5 PRESENCE OF CORONARY ANGIOPLASTY IMPLANT AND GRAFT: ICD-10-CM

## 2025-04-07 DIAGNOSIS — I10 ESSENTIAL (PRIMARY) HYPERTENSION: ICD-10-CM

## 2025-04-07 DIAGNOSIS — E78.5 HYPERLIPIDEMIA, UNSPECIFIED: ICD-10-CM

## 2025-04-07 DIAGNOSIS — R07.9 CHEST PAIN, UNSPECIFIED: ICD-10-CM

## 2025-04-07 DIAGNOSIS — T39.016A UNDERDOSING OF ASPIRIN, INITIAL ENCOUNTER: ICD-10-CM

## 2025-04-07 DIAGNOSIS — I25.10 ATHEROSCLEROTIC HEART DISEASE OF NATIVE CORONARY ARTERY WITHOUT ANGINA PECTORIS: ICD-10-CM
